# Patient Record
Sex: FEMALE | Race: WHITE | NOT HISPANIC OR LATINO | Employment: FULL TIME | URBAN - METROPOLITAN AREA
[De-identification: names, ages, dates, MRNs, and addresses within clinical notes are randomized per-mention and may not be internally consistent; named-entity substitution may affect disease eponyms.]

---

## 2022-12-10 ENCOUNTER — HOSPITAL ENCOUNTER (OUTPATIENT)
Facility: HOSPITAL | Age: 77
Setting detail: OBSERVATION
Discharge: HOME | End: 2022-12-12
Attending: EMERGENCY MEDICINE | Admitting: INTERNAL MEDICINE

## 2022-12-10 ENCOUNTER — APPOINTMENT (OUTPATIENT)
Dept: CT IMAGING | Facility: HOSPITAL | Age: 77
End: 2022-12-10

## 2022-12-10 DIAGNOSIS — R55 SYNCOPE, UNSPECIFIED SYNCOPE TYPE: Primary | ICD-10-CM

## 2022-12-10 LAB
ALBUMIN SERPL-MCNC: 4.1 G/DL (ref 3.5–5.2)
ALBUMIN/GLOB SERPL: 1.3 G/DL
ALP SERPL-CCNC: 75 U/L (ref 39–117)
ALT SERPL W P-5'-P-CCNC: 12 U/L (ref 1–33)
ANION GAP SERPL CALCULATED.3IONS-SCNC: 14.2 MMOL/L (ref 5–15)
AST SERPL-CCNC: 19 U/L (ref 1–32)
BACTERIA UR QL AUTO: ABNORMAL /HPF
BASOPHILS # BLD AUTO: 0.04 10*3/MM3 (ref 0–0.2)
BASOPHILS NFR BLD AUTO: 0.3 % (ref 0–1.5)
BILIRUB SERPL-MCNC: 0.2 MG/DL (ref 0–1.2)
BILIRUB UR QL STRIP: NEGATIVE
BUN SERPL-MCNC: 22 MG/DL (ref 8–23)
BUN/CREAT SERPL: 20.4 (ref 7–25)
CALCIUM SPEC-SCNC: 8.9 MG/DL (ref 8.6–10.5)
CHLORIDE SERPL-SCNC: 101 MMOL/L (ref 98–107)
CLARITY UR: ABNORMAL
CO2 SERPL-SCNC: 20.8 MMOL/L (ref 22–29)
COLOR UR: YELLOW
CREAT SERPL-MCNC: 1.08 MG/DL (ref 0.57–1)
DEPRECATED RDW RBC AUTO: 45.8 FL (ref 37–54)
EGFRCR SERPLBLD CKD-EPI 2021: 53.3 ML/MIN/1.73
EOSINOPHIL # BLD AUTO: 0.29 10*3/MM3 (ref 0–0.4)
EOSINOPHIL NFR BLD AUTO: 2.4 % (ref 0.3–6.2)
ERYTHROCYTE [DISTWIDTH] IN BLOOD BY AUTOMATED COUNT: 14.1 % (ref 12.3–15.4)
GLOBULIN UR ELPH-MCNC: 3.2 GM/DL
GLUCOSE BLDC GLUCOMTR-MCNC: 112 MG/DL (ref 70–99)
GLUCOSE SERPL-MCNC: 113 MG/DL (ref 65–99)
GLUCOSE UR STRIP-MCNC: NEGATIVE MG/DL
HCT VFR BLD AUTO: 33.3 % (ref 34–46.6)
HGB BLD-MCNC: 10.8 G/DL (ref 12–15.9)
HGB UR QL STRIP.AUTO: ABNORMAL
HOLD SPECIMEN: NORMAL
HOLD SPECIMEN: NORMAL
HYALINE CASTS UR QL AUTO: ABNORMAL /LPF
IMM GRANULOCYTES # BLD AUTO: 0.05 10*3/MM3 (ref 0–0.05)
IMM GRANULOCYTES NFR BLD AUTO: 0.4 % (ref 0–0.5)
KETONES UR QL STRIP: ABNORMAL
LEUKOCYTE ESTERASE UR QL STRIP.AUTO: ABNORMAL
LYMPHOCYTES # BLD AUTO: 2.61 10*3/MM3 (ref 0.7–3.1)
LYMPHOCYTES NFR BLD AUTO: 21.8 % (ref 19.6–45.3)
MAGNESIUM SERPL-MCNC: 2 MG/DL (ref 1.6–2.4)
MCH RBC QN AUTO: 29 PG (ref 26.6–33)
MCHC RBC AUTO-ENTMCNC: 32.4 G/DL (ref 31.5–35.7)
MCV RBC AUTO: 89.5 FL (ref 79–97)
MONOCYTES # BLD AUTO: 1.03 10*3/MM3 (ref 0.1–0.9)
MONOCYTES NFR BLD AUTO: 8.6 % (ref 5–12)
NEUTROPHILS NFR BLD AUTO: 66.5 % (ref 42.7–76)
NEUTROPHILS NFR BLD AUTO: 7.98 10*3/MM3 (ref 1.7–7)
NITRITE UR QL STRIP: NEGATIVE
NRBC BLD AUTO-RTO: 0 /100 WBC (ref 0–0.2)
PH UR STRIP.AUTO: 5.5 [PH] (ref 5–8)
PLATELET # BLD AUTO: 424 10*3/MM3 (ref 140–450)
PMV BLD AUTO: 8.5 FL (ref 6–12)
POTASSIUM SERPL-SCNC: 3 MMOL/L (ref 3.5–5.2)
PROT SERPL-MCNC: 7.3 G/DL (ref 6–8.5)
PROT UR QL STRIP: ABNORMAL
RBC # BLD AUTO: 3.72 10*6/MM3 (ref 3.77–5.28)
RBC # UR STRIP: ABNORMAL /HPF
REF LAB TEST METHOD: ABNORMAL
SODIUM SERPL-SCNC: 136 MMOL/L (ref 136–145)
SP GR UR STRIP: 1.02 (ref 1–1.03)
SQUAMOUS #/AREA URNS HPF: ABNORMAL /HPF
TROPONIN T SERPL-MCNC: <0.01 NG/ML (ref 0–0.03)
UROBILINOGEN UR QL STRIP: ABNORMAL
WBC # UR STRIP: ABNORMAL /HPF
WBC NRBC COR # BLD: 12 10*3/MM3 (ref 3.4–10.8)
WHOLE BLOOD HOLD COAG: NORMAL
WHOLE BLOOD HOLD SPECIMEN: NORMAL

## 2022-12-10 PROCEDURE — 83735 ASSAY OF MAGNESIUM: CPT | Performed by: EMERGENCY MEDICINE

## 2022-12-10 PROCEDURE — G0378 HOSPITAL OBSERVATION PER HR: HCPCS

## 2022-12-10 PROCEDURE — 93005 ELECTROCARDIOGRAM TRACING: CPT

## 2022-12-10 PROCEDURE — 81003 URINALYSIS AUTO W/O SCOPE: CPT | Performed by: EMERGENCY MEDICINE

## 2022-12-10 PROCEDURE — 96374 THER/PROPH/DIAG INJ IV PUSH: CPT

## 2022-12-10 PROCEDURE — 99285 EMERGENCY DEPT VISIT HI MDM: CPT

## 2022-12-10 PROCEDURE — 81015 MICROSCOPIC EXAM OF URINE: CPT | Performed by: EMERGENCY MEDICINE

## 2022-12-10 PROCEDURE — 70450 CT HEAD/BRAIN W/O DYE: CPT

## 2022-12-10 PROCEDURE — 82962 GLUCOSE BLOOD TEST: CPT

## 2022-12-10 PROCEDURE — 94799 UNLISTED PULMONARY SVC/PX: CPT

## 2022-12-10 PROCEDURE — 85025 COMPLETE CBC W/AUTO DIFF WBC: CPT | Performed by: EMERGENCY MEDICINE

## 2022-12-10 PROCEDURE — 99220 PR INITIAL OBSERVATION CARE/DAY 70 MINUTES: CPT | Performed by: INTERNAL MEDICINE

## 2022-12-10 PROCEDURE — 84484 ASSAY OF TROPONIN QUANT: CPT | Performed by: EMERGENCY MEDICINE

## 2022-12-10 PROCEDURE — 80053 COMPREHEN METABOLIC PANEL: CPT | Performed by: EMERGENCY MEDICINE

## 2022-12-10 PROCEDURE — 93010 ELECTROCARDIOGRAM REPORT: CPT | Performed by: INTERNAL MEDICINE

## 2022-12-10 PROCEDURE — 93005 ELECTROCARDIOGRAM TRACING: CPT | Performed by: EMERGENCY MEDICINE

## 2022-12-10 PROCEDURE — 96376 TX/PRO/DX INJ SAME DRUG ADON: CPT

## 2022-12-10 RX ORDER — SODIUM CHLORIDE 9 MG/ML
40 INJECTION, SOLUTION INTRAVENOUS AS NEEDED
Status: DISCONTINUED | OUTPATIENT
Start: 2022-12-10 | End: 2022-12-12 | Stop reason: HOSPADM

## 2022-12-10 RX ORDER — SODIUM CHLORIDE 0.9 % (FLUSH) 0.9 %
10 SYRINGE (ML) INJECTION AS NEEDED
Status: DISCONTINUED | OUTPATIENT
Start: 2022-12-10 | End: 2022-12-12 | Stop reason: HOSPADM

## 2022-12-10 RX ORDER — NITROGLYCERIN 0.4 MG/1
0.4 TABLET SUBLINGUAL
Status: DISCONTINUED | OUTPATIENT
Start: 2022-12-10 | End: 2022-12-12 | Stop reason: HOSPADM

## 2022-12-10 RX ORDER — LABETALOL HYDROCHLORIDE 5 MG/ML
10 INJECTION, SOLUTION INTRAVENOUS EVERY 6 HOURS PRN
Status: DISCONTINUED | OUTPATIENT
Start: 2022-12-10 | End: 2022-12-11

## 2022-12-10 RX ORDER — ACETAMINOPHEN 325 MG/1
650 TABLET ORAL EVERY 4 HOURS PRN
Status: DISCONTINUED | OUTPATIENT
Start: 2022-12-10 | End: 2022-12-12 | Stop reason: HOSPADM

## 2022-12-10 RX ORDER — CITALOPRAM 10 MG/1
10 TABLET ORAL DAILY
Status: ON HOLD | COMMUNITY
End: 2022-12-12 | Stop reason: SDUPTHER

## 2022-12-10 RX ORDER — HYDRALAZINE HYDROCHLORIDE 25 MG/1
25 TABLET, FILM COATED ORAL EVERY 8 HOURS PRN
Status: DISCONTINUED | OUTPATIENT
Start: 2022-12-10 | End: 2022-12-12 | Stop reason: HOSPADM

## 2022-12-10 RX ORDER — SODIUM CHLORIDE 0.9 % (FLUSH) 0.9 %
10 SYRINGE (ML) INJECTION EVERY 12 HOURS SCHEDULED
Status: DISCONTINUED | OUTPATIENT
Start: 2022-12-10 | End: 2022-12-12 | Stop reason: HOSPADM

## 2022-12-10 RX ORDER — ONDANSETRON 2 MG/ML
4 INJECTION INTRAMUSCULAR; INTRAVENOUS EVERY 6 HOURS PRN
Status: DISCONTINUED | OUTPATIENT
Start: 2022-12-10 | End: 2022-12-12 | Stop reason: HOSPADM

## 2022-12-10 RX ORDER — ENOXAPARIN SODIUM 100 MG/ML
40 INJECTION SUBCUTANEOUS NIGHTLY
Status: DISCONTINUED | OUTPATIENT
Start: 2022-12-10 | End: 2022-12-12 | Stop reason: HOSPADM

## 2022-12-10 RX ORDER — LABETALOL HYDROCHLORIDE 5 MG/ML
20 INJECTION, SOLUTION INTRAVENOUS ONCE
Status: COMPLETED | OUTPATIENT
Start: 2022-12-10 | End: 2022-12-10

## 2022-12-10 RX ORDER — AMLODIPINE BESYLATE 5 MG/1
5 TABLET ORAL
Status: DISCONTINUED | OUTPATIENT
Start: 2022-12-10 | End: 2022-12-11

## 2022-12-10 RX ORDER — ACETAMINOPHEN 650 MG/1
650 SUPPOSITORY RECTAL EVERY 4 HOURS PRN
Status: DISCONTINUED | OUTPATIENT
Start: 2022-12-10 | End: 2022-12-12 | Stop reason: HOSPADM

## 2022-12-10 RX ORDER — ACETAMINOPHEN 160 MG/5ML
650 SOLUTION ORAL EVERY 4 HOURS PRN
Status: DISCONTINUED | OUTPATIENT
Start: 2022-12-10 | End: 2022-12-12 | Stop reason: HOSPADM

## 2022-12-10 RX ADMIN — LABETALOL 20 MG/4 ML (5 MG/ML) INTRAVENOUS SYRINGE 20 MG: at 13:04

## 2022-12-10 RX ADMIN — LABETALOL 20 MG/4 ML (5 MG/ML) INTRAVENOUS SYRINGE 20 MG: at 15:56

## 2022-12-10 RX ADMIN — Medication 10 ML: at 20:36

## 2022-12-10 RX ADMIN — AMLODIPINE BESYLATE 5 MG: 5 TABLET ORAL at 20:02

## 2022-12-11 ENCOUNTER — APPOINTMENT (OUTPATIENT)
Dept: CARDIOLOGY | Facility: HOSPITAL | Age: 77
End: 2022-12-11

## 2022-12-11 LAB
ALBUMIN SERPL-MCNC: 3.5 G/DL (ref 3.5–5.2)
ALBUMIN/GLOB SERPL: 1.3 G/DL
ALP SERPL-CCNC: 63 U/L (ref 39–117)
ALT SERPL W P-5'-P-CCNC: 9 U/L (ref 1–33)
ANION GAP SERPL CALCULATED.3IONS-SCNC: 9.6 MMOL/L (ref 5–15)
AST SERPL-CCNC: 13 U/L (ref 1–32)
BASOPHILS # BLD AUTO: 0.02 10*3/MM3 (ref 0–0.2)
BASOPHILS NFR BLD AUTO: 0.2 % (ref 0–1.5)
BH CV ECHO MEAS - AO ROOT DIAM: 3.6 CM
BH CV ECHO MEAS - EDV(MOD-SP2): 43 ML
BH CV ECHO MEAS - EDV(MOD-SP4): 43 ML
BH CV ECHO MEAS - EF(MOD-BP): 53.4 %
BH CV ECHO MEAS - ESV(MOD-SP2): 20 ML
BH CV ECHO MEAS - ESV(MOD-SP4): 20 ML
BH CV ECHO MEAS - IVSD: 1.3 CM
BH CV ECHO MEAS - LA DIMENSION: 3 CM
BH CV ECHO MEAS - LAT PEAK E' VEL: 7 CM/SEC
BH CV ECHO MEAS - LVIDD: 3.6 CM
BH CV ECHO MEAS - LVIDS: 2.5 CM
BH CV ECHO MEAS - LVOT DIAM: 2 CM
BH CV ECHO MEAS - LVPWD: 1.1 CM
BH CV ECHO MEAS - MED PEAK E' VEL: 5 CM/SEC
BH CV ECHO MEAS - MV A MAX VEL: 114 CM/SEC
BH CV ECHO MEAS - MV DEC TIME: 184 MSEC
BH CV ECHO MEAS - MV E MAX VEL: 77 CM/SEC
BH CV ECHO MEAS - MV E/A: 0.7
BH CV ECHO MEAS - RVDD: 2.7 CM
BH CV ECHO MEAS - TAPSE (>1.6): 2.48 CM
BH CV ECHO MEASUREMENTS AVERAGE E/E' RATIO: 12.83
BILIRUB SERPL-MCNC: <0.2 MG/DL (ref 0–1.2)
BUN SERPL-MCNC: 20 MG/DL (ref 8–23)
BUN/CREAT SERPL: 23.3 (ref 7–25)
CALCIUM SPEC-SCNC: 8.6 MG/DL (ref 8.6–10.5)
CHLORIDE SERPL-SCNC: 107 MMOL/L (ref 98–107)
CO2 SERPL-SCNC: 24.4 MMOL/L (ref 22–29)
CREAT SERPL-MCNC: 0.86 MG/DL (ref 0.57–1)
DEPRECATED RDW RBC AUTO: 45.1 FL (ref 37–54)
EGFRCR SERPLBLD CKD-EPI 2021: 70.1 ML/MIN/1.73
EOSINOPHIL # BLD AUTO: 0.34 10*3/MM3 (ref 0–0.4)
EOSINOPHIL NFR BLD AUTO: 3.9 % (ref 0.3–6.2)
ERYTHROCYTE [DISTWIDTH] IN BLOOD BY AUTOMATED COUNT: 13.9 % (ref 12.3–15.4)
FERRITIN SERPL-MCNC: 77.91 NG/ML (ref 13–150)
GLOBULIN UR ELPH-MCNC: 2.7 GM/DL
GLUCOSE SERPL-MCNC: 118 MG/DL (ref 65–99)
HBA1C MFR BLD: 6.1 % (ref 4.8–5.6)
HCT VFR BLD AUTO: 29.2 % (ref 34–46.6)
HGB BLD-MCNC: 9.5 G/DL (ref 12–15.9)
IMM GRANULOCYTES # BLD AUTO: 0.03 10*3/MM3 (ref 0–0.05)
IMM GRANULOCYTES NFR BLD AUTO: 0.3 % (ref 0–0.5)
IRON 24H UR-MRATE: 24 MCG/DL (ref 37–145)
IRON SATN MFR SERPL: 7 % (ref 20–50)
IVRT: 74 MSEC
LEFT ATRIUM VOLUME INDEX: 19.2 ML/M2
LYMPHOCYTES # BLD AUTO: 2.97 10*3/MM3 (ref 0.7–3.1)
LYMPHOCYTES NFR BLD AUTO: 33.9 % (ref 19.6–45.3)
MAGNESIUM SERPL-MCNC: 2 MG/DL (ref 1.6–2.4)
MAXIMAL PREDICTED HEART RATE: 144 BPM
MCH RBC QN AUTO: 28.7 PG (ref 26.6–33)
MCHC RBC AUTO-ENTMCNC: 32.5 G/DL (ref 31.5–35.7)
MCV RBC AUTO: 88.2 FL (ref 79–97)
MONOCYTES # BLD AUTO: 0.98 10*3/MM3 (ref 0.1–0.9)
MONOCYTES NFR BLD AUTO: 11.2 % (ref 5–12)
NEUTROPHILS NFR BLD AUTO: 4.43 10*3/MM3 (ref 1.7–7)
NEUTROPHILS NFR BLD AUTO: 50.5 % (ref 42.7–76)
NRBC BLD AUTO-RTO: 0 /100 WBC (ref 0–0.2)
PLATELET # BLD AUTO: 391 10*3/MM3 (ref 140–450)
PMV BLD AUTO: 8.3 FL (ref 6–12)
POTASSIUM SERPL-SCNC: 3.6 MMOL/L (ref 3.5–5.2)
PROT SERPL-MCNC: 6.2 G/DL (ref 6–8.5)
QT INTERVAL: 426 MS
RBC # BLD AUTO: 3.31 10*6/MM3 (ref 3.77–5.28)
RETICS # AUTO: 0.04 10*6/MM3 (ref 0.02–0.13)
RETICS/RBC NFR AUTO: 1.32 % (ref 0.7–1.9)
SODIUM SERPL-SCNC: 141 MMOL/L (ref 136–145)
STRESS TARGET HR: 122 BPM
TIBC SERPL-MCNC: 322 MCG/DL (ref 298–536)
TRANSFERRIN SERPL-MCNC: 216 MG/DL (ref 200–360)
WBC NRBC COR # BLD: 8.77 10*3/MM3 (ref 3.4–10.8)

## 2022-12-11 PROCEDURE — 83540 ASSAY OF IRON: CPT | Performed by: INTERNAL MEDICINE

## 2022-12-11 PROCEDURE — 99226 PR SBSQ OBSERVATION CARE/DAY 35 MINUTES: CPT | Performed by: INTERNAL MEDICINE

## 2022-12-11 PROCEDURE — 82728 ASSAY OF FERRITIN: CPT | Performed by: INTERNAL MEDICINE

## 2022-12-11 PROCEDURE — 85045 AUTOMATED RETICULOCYTE COUNT: CPT | Performed by: INTERNAL MEDICINE

## 2022-12-11 PROCEDURE — G0378 HOSPITAL OBSERVATION PER HR: HCPCS

## 2022-12-11 PROCEDURE — 83036 HEMOGLOBIN GLYCOSYLATED A1C: CPT | Performed by: INTERNAL MEDICINE

## 2022-12-11 PROCEDURE — 85025 COMPLETE CBC W/AUTO DIFF WBC: CPT | Performed by: INTERNAL MEDICINE

## 2022-12-11 PROCEDURE — 80053 COMPREHEN METABOLIC PANEL: CPT | Performed by: INTERNAL MEDICINE

## 2022-12-11 PROCEDURE — 94799 UNLISTED PULMONARY SVC/PX: CPT

## 2022-12-11 PROCEDURE — 93306 TTE W/DOPPLER COMPLETE: CPT

## 2022-12-11 PROCEDURE — 83735 ASSAY OF MAGNESIUM: CPT | Performed by: INTERNAL MEDICINE

## 2022-12-11 PROCEDURE — 84466 ASSAY OF TRANSFERRIN: CPT | Performed by: INTERNAL MEDICINE

## 2022-12-11 RX ORDER — LABETALOL HYDROCHLORIDE 5 MG/ML
10 INJECTION, SOLUTION INTRAVENOUS EVERY 4 HOURS PRN
Status: DISCONTINUED | OUTPATIENT
Start: 2022-12-11 | End: 2022-12-12 | Stop reason: HOSPADM

## 2022-12-11 RX ORDER — POTASSIUM CHLORIDE 750 MG/1
40 CAPSULE, EXTENDED RELEASE ORAL ONCE
Status: COMPLETED | OUTPATIENT
Start: 2022-12-11 | End: 2022-12-11

## 2022-12-11 RX ORDER — SODIUM CHLORIDE, SODIUM LACTATE, POTASSIUM CHLORIDE, CALCIUM CHLORIDE 600; 310; 30; 20 MG/100ML; MG/100ML; MG/100ML; MG/100ML
100 INJECTION, SOLUTION INTRAVENOUS CONTINUOUS
Status: ACTIVE | OUTPATIENT
Start: 2022-12-11 | End: 2022-12-11

## 2022-12-11 RX ORDER — CARVEDILOL 6.25 MG/1
6.25 TABLET ORAL 2 TIMES DAILY WITH MEALS
Status: DISCONTINUED | OUTPATIENT
Start: 2022-12-11 | End: 2022-12-12 | Stop reason: HOSPADM

## 2022-12-11 RX ORDER — FERROUS SULFATE 325(65) MG
325 TABLET ORAL
Status: DISCONTINUED | OUTPATIENT
Start: 2022-12-12 | End: 2022-12-12 | Stop reason: HOSPADM

## 2022-12-11 RX ORDER — LORAZEPAM 2 MG/ML
0.5 INJECTION INTRAMUSCULAR ONCE
Status: DISCONTINUED | OUTPATIENT
Start: 2022-12-11 | End: 2022-12-11

## 2022-12-11 RX ORDER — CARVEDILOL 3.12 MG/1
3.12 TABLET ORAL 2 TIMES DAILY WITH MEALS
Status: DISCONTINUED | OUTPATIENT
Start: 2022-12-11 | End: 2022-12-11

## 2022-12-11 RX ADMIN — CARVEDILOL 6.25 MG: 6.25 TABLET, FILM COATED ORAL at 17:30

## 2022-12-11 RX ADMIN — SODIUM CHLORIDE, POTASSIUM CHLORIDE, SODIUM LACTATE AND CALCIUM CHLORIDE 100 ML/HR: 600; 310; 30; 20 INJECTION, SOLUTION INTRAVENOUS at 11:16

## 2022-12-11 RX ADMIN — CARVEDILOL 3.12 MG: 3.12 TABLET, FILM COATED ORAL at 11:07

## 2022-12-11 RX ADMIN — Medication 10 ML: at 09:36

## 2022-12-11 RX ADMIN — POTASSIUM CHLORIDE 40 MEQ: 10 CAPSULE, COATED, EXTENDED RELEASE ORAL at 11:07

## 2022-12-11 RX ADMIN — AMLODIPINE BESYLATE 5 MG: 5 TABLET ORAL at 09:36

## 2022-12-11 RX ADMIN — Medication 10 ML: at 20:00

## 2022-12-12 ENCOUNTER — READMISSION MANAGEMENT (OUTPATIENT)
Dept: CALL CENTER | Facility: HOSPITAL | Age: 77
End: 2022-12-12

## 2022-12-12 VITALS
HEART RATE: 79 BPM | HEIGHT: 65 IN | OXYGEN SATURATION: 100 % | WEIGHT: 141.98 LBS | BODY MASS INDEX: 23.65 KG/M2 | RESPIRATION RATE: 18 BRPM | DIASTOLIC BLOOD PRESSURE: 85 MMHG | TEMPERATURE: 97.7 F | SYSTOLIC BLOOD PRESSURE: 179 MMHG

## 2022-12-12 LAB
ALBUMIN SERPL-MCNC: 3.6 G/DL (ref 3.5–5.2)
ANION GAP SERPL CALCULATED.3IONS-SCNC: 7.2 MMOL/L (ref 5–15)
BUN SERPL-MCNC: 15 MG/DL (ref 8–23)
BUN/CREAT SERPL: 20.5 (ref 7–25)
CALCIUM SPEC-SCNC: 8.9 MG/DL (ref 8.6–10.5)
CHLORIDE SERPL-SCNC: 104 MMOL/L (ref 98–107)
CO2 SERPL-SCNC: 25.8 MMOL/L (ref 22–29)
CREAT SERPL-MCNC: 0.73 MG/DL (ref 0.57–1)
DEPRECATED RDW RBC AUTO: 45.3 FL (ref 37–54)
EGFRCR SERPLBLD CKD-EPI 2021: 85.4 ML/MIN/1.73
ERYTHROCYTE [DISTWIDTH] IN BLOOD BY AUTOMATED COUNT: 14 % (ref 12.3–15.4)
FOLATE SERPL-MCNC: 7.84 NG/ML (ref 4.78–24.2)
GLUCOSE SERPL-MCNC: 114 MG/DL (ref 65–99)
HCT VFR BLD AUTO: 29.5 % (ref 34–46.6)
HGB BLD-MCNC: 9.6 G/DL (ref 12–15.9)
MAGNESIUM SERPL-MCNC: 2 MG/DL (ref 1.6–2.4)
MCH RBC QN AUTO: 28.7 PG (ref 26.6–33)
MCHC RBC AUTO-ENTMCNC: 32.5 G/DL (ref 31.5–35.7)
MCV RBC AUTO: 88.3 FL (ref 79–97)
PHOSPHATE SERPL-MCNC: 3.3 MG/DL (ref 2.5–4.5)
PLATELET # BLD AUTO: 386 10*3/MM3 (ref 140–450)
PMV BLD AUTO: 8.6 FL (ref 6–12)
POTASSIUM SERPL-SCNC: 4.1 MMOL/L (ref 3.5–5.2)
QT INTERVAL: 443 MS
RBC # BLD AUTO: 3.34 10*6/MM3 (ref 3.77–5.28)
SODIUM SERPL-SCNC: 137 MMOL/L (ref 136–145)
TROPONIN T SERPL-MCNC: <0.01 NG/ML (ref 0–0.03)
TSH SERPL DL<=0.05 MIU/L-ACNC: 1.33 UIU/ML (ref 0.27–4.2)
VIT B12 BLD-MCNC: 183 PG/ML (ref 211–946)
WBC NRBC COR # BLD: 8.77 10*3/MM3 (ref 3.4–10.8)

## 2022-12-12 PROCEDURE — 93010 ELECTROCARDIOGRAM REPORT: CPT | Performed by: INTERNAL MEDICINE

## 2022-12-12 PROCEDURE — 94799 UNLISTED PULMONARY SVC/PX: CPT

## 2022-12-12 PROCEDURE — 82607 VITAMIN B-12: CPT | Performed by: INTERNAL MEDICINE

## 2022-12-12 PROCEDURE — 93005 ELECTROCARDIOGRAM TRACING: CPT | Performed by: INTERNAL MEDICINE

## 2022-12-12 PROCEDURE — 99217 PR OBSERVATION CARE DISCHARGE MANAGEMENT: CPT | Performed by: INTERNAL MEDICINE

## 2022-12-12 PROCEDURE — 84443 ASSAY THYROID STIM HORMONE: CPT | Performed by: INTERNAL MEDICINE

## 2022-12-12 PROCEDURE — 82746 ASSAY OF FOLIC ACID SERUM: CPT | Performed by: INTERNAL MEDICINE

## 2022-12-12 PROCEDURE — 85027 COMPLETE CBC AUTOMATED: CPT | Performed by: INTERNAL MEDICINE

## 2022-12-12 PROCEDURE — 80069 RENAL FUNCTION PANEL: CPT | Performed by: INTERNAL MEDICINE

## 2022-12-12 PROCEDURE — 83735 ASSAY OF MAGNESIUM: CPT | Performed by: INTERNAL MEDICINE

## 2022-12-12 PROCEDURE — G0378 HOSPITAL OBSERVATION PER HR: HCPCS

## 2022-12-12 PROCEDURE — 84484 ASSAY OF TROPONIN QUANT: CPT | Performed by: INTERNAL MEDICINE

## 2022-12-12 RX ORDER — AMOXICILLIN 250 MG
1 CAPSULE ORAL DAILY PRN
Qty: 30 TABLET | Refills: 0 | Status: SHIPPED | OUTPATIENT
Start: 2022-12-12 | End: 2023-01-11

## 2022-12-12 RX ORDER — LANOLIN ALCOHOL/MO/W.PET/CERES
1000 CREAM (GRAM) TOPICAL DAILY
Qty: 90 TABLET | Refills: 0 | Status: SHIPPED | OUTPATIENT
Start: 2022-12-12 | End: 2023-03-12

## 2022-12-12 RX ORDER — CITALOPRAM 10 MG/1
5 TABLET ORAL DAILY
Start: 2022-12-12

## 2022-12-12 RX ORDER — CARVEDILOL 6.25 MG/1
6.25 TABLET ORAL 2 TIMES DAILY WITH MEALS
Qty: 60 TABLET | Refills: 0 | Status: SHIPPED | OUTPATIENT
Start: 2022-12-12 | End: 2023-01-11

## 2022-12-12 RX ORDER — FERROUS SULFATE 325(65) MG
325 TABLET ORAL
Qty: 90 TABLET | Refills: 0 | Status: SHIPPED | OUTPATIENT
Start: 2022-12-12

## 2022-12-12 RX ADMIN — FERROUS SULFATE TAB 325 MG (65 MG ELEMENTAL FE) 325 MG: 325 (65 FE) TAB at 08:41

## 2022-12-12 RX ADMIN — CARVEDILOL 6.25 MG: 6.25 TABLET, FILM COATED ORAL at 08:41

## 2022-12-12 RX ADMIN — Medication 10 ML: at 08:41

## 2024-09-30 ENCOUNTER — APPOINTMENT (OUTPATIENT)
Dept: GENERAL RADIOLOGY | Facility: HOSPITAL | Age: 79
End: 2024-09-30
Payer: COMMERCIAL

## 2024-09-30 ENCOUNTER — APPOINTMENT (OUTPATIENT)
Dept: CT IMAGING | Facility: HOSPITAL | Age: 79
End: 2024-09-30
Payer: COMMERCIAL

## 2024-09-30 ENCOUNTER — HOSPITAL ENCOUNTER (EMERGENCY)
Facility: HOSPITAL | Age: 79
Discharge: HOME | End: 2024-09-30
Attending: EMERGENCY MEDICINE
Payer: COMMERCIAL

## 2024-09-30 ENCOUNTER — TRANSCRIBE ORDERS (OUTPATIENT)
Dept: ADMINISTRATIVE | Facility: HOSPITAL | Age: 79
End: 2024-09-30
Payer: COMMERCIAL

## 2024-09-30 VITALS
WEIGHT: 132.72 LBS | OXYGEN SATURATION: 98 % | RESPIRATION RATE: 18 BRPM | HEIGHT: 65 IN | HEART RATE: 71 BPM | DIASTOLIC BLOOD PRESSURE: 72 MMHG | BODY MASS INDEX: 22.11 KG/M2 | TEMPERATURE: 97.6 F | SYSTOLIC BLOOD PRESSURE: 119 MMHG

## 2024-09-30 DIAGNOSIS — N39.0 ACUTE URINARY TRACT INFECTION: Primary | ICD-10-CM

## 2024-09-30 DIAGNOSIS — R59.0 LOCALIZED ENLARGED LYMPH NODES: ICD-10-CM

## 2024-09-30 DIAGNOSIS — M19.031 ARTHRITIS OF RIGHT WRIST: ICD-10-CM

## 2024-09-30 DIAGNOSIS — K76.9 LIVER LESION: ICD-10-CM

## 2024-09-30 DIAGNOSIS — K68.3 RETROPERITONEAL HEMATOMA: Primary | ICD-10-CM

## 2024-09-30 DIAGNOSIS — R91.8 PULMONARY NODULES: ICD-10-CM

## 2024-09-30 LAB
ALBUMIN SERPL-MCNC: 3.7 G/DL (ref 3.5–5.2)
ALBUMIN/GLOB SERPL: 0.9 G/DL
ALP SERPL-CCNC: 80 U/L (ref 39–117)
ALT SERPL W P-5'-P-CCNC: 6 U/L (ref 1–33)
AMORPH URATE CRY URNS QL MICRO: ABNORMAL /HPF
ANION GAP SERPL CALCULATED.3IONS-SCNC: 11.8 MMOL/L (ref 5–15)
AST SERPL-CCNC: 14 U/L (ref 1–32)
BACTERIA UR QL AUTO: ABNORMAL /HPF
BASOPHILS # BLD AUTO: 0.06 10*3/MM3 (ref 0–0.2)
BASOPHILS NFR BLD AUTO: 0.5 % (ref 0–1.5)
BILIRUB SERPL-MCNC: 0.3 MG/DL (ref 0–1.2)
BILIRUB UR QL STRIP: NEGATIVE
BUN SERPL-MCNC: 23 MG/DL (ref 8–23)
BUN/CREAT SERPL: 19.5 (ref 7–25)
CALCIUM SPEC-SCNC: 9.4 MG/DL (ref 8.6–10.5)
CHLORIDE SERPL-SCNC: 102 MMOL/L (ref 98–107)
CLARITY UR: ABNORMAL
CO2 SERPL-SCNC: 22.2 MMOL/L (ref 22–29)
COLOR UR: YELLOW
CREAT SERPL-MCNC: 1.18 MG/DL (ref 0.57–1)
DEPRECATED RDW RBC AUTO: 44.6 FL (ref 37–54)
EGFRCR SERPLBLD CKD-EPI 2021: 47.4 ML/MIN/1.73
EOSINOPHIL # BLD AUTO: 0.24 10*3/MM3 (ref 0–0.4)
EOSINOPHIL NFR BLD AUTO: 2 % (ref 0.3–6.2)
ERYTHROCYTE [DISTWIDTH] IN BLOOD BY AUTOMATED COUNT: 13.4 % (ref 12.3–15.4)
GLOBULIN UR ELPH-MCNC: 3.9 GM/DL
GLUCOSE SERPL-MCNC: 145 MG/DL (ref 65–99)
GLUCOSE UR STRIP-MCNC: NEGATIVE MG/DL
HCT VFR BLD AUTO: 30.2 % (ref 34–46.6)
HGB BLD-MCNC: 9.4 G/DL (ref 12–15.9)
HGB UR QL STRIP.AUTO: ABNORMAL
HOLD SPECIMEN: NORMAL
HOLD SPECIMEN: NORMAL
HYALINE CASTS UR QL AUTO: ABNORMAL /LPF
IMM GRANULOCYTES # BLD AUTO: 0.07 10*3/MM3 (ref 0–0.05)
IMM GRANULOCYTES NFR BLD AUTO: 0.6 % (ref 0–0.5)
KETONES UR QL STRIP: NEGATIVE
LEUKOCYTE ESTERASE UR QL STRIP.AUTO: NEGATIVE
LYMPHOCYTES # BLD AUTO: 1.65 10*3/MM3 (ref 0.7–3.1)
LYMPHOCYTES NFR BLD AUTO: 13.4 % (ref 19.6–45.3)
MAGNESIUM SERPL-MCNC: 2.1 MG/DL (ref 1.6–2.4)
MCH RBC QN AUTO: 28.1 PG (ref 26.6–33)
MCHC RBC AUTO-ENTMCNC: 31.1 G/DL (ref 31.5–35.7)
MCV RBC AUTO: 90.4 FL (ref 79–97)
MONOCYTES # BLD AUTO: 1.1 10*3/MM3 (ref 0.1–0.9)
MONOCYTES NFR BLD AUTO: 8.9 % (ref 5–12)
NEUTROPHILS NFR BLD AUTO: 74.6 % (ref 42.7–76)
NEUTROPHILS NFR BLD AUTO: 9.18 10*3/MM3 (ref 1.7–7)
NITRITE UR QL STRIP: NEGATIVE
NRBC BLD AUTO-RTO: 0 /100 WBC (ref 0–0.2)
PH UR STRIP.AUTO: 7 [PH] (ref 5–8)
PLATELET # BLD AUTO: 399 10*3/MM3 (ref 140–450)
PMV BLD AUTO: 8.9 FL (ref 6–12)
POTASSIUM SERPL-SCNC: 4.5 MMOL/L (ref 3.5–5.2)
PROT SERPL-MCNC: 7.6 G/DL (ref 6–8.5)
PROT UR QL STRIP: NEGATIVE
QT INTERVAL: 450 MS
QTC INTERVAL: 491 MS
RBC # BLD AUTO: 3.34 10*6/MM3 (ref 3.77–5.28)
RBC # UR STRIP: ABNORMAL /HPF
REF LAB TEST METHOD: ABNORMAL
SODIUM SERPL-SCNC: 136 MMOL/L (ref 136–145)
SP GR UR STRIP: <=1.005 (ref 1–1.03)
SQUAMOUS #/AREA URNS HPF: ABNORMAL /HPF
TROPONIN T SERPL HS-MCNC: 31 NG/L
UROBILINOGEN UR QL STRIP: ABNORMAL
WBC # UR STRIP: ABNORMAL /HPF
WBC NRBC COR # BLD AUTO: 12.3 10*3/MM3 (ref 3.4–10.8)
WHOLE BLOOD HOLD COAG: NORMAL
WHOLE BLOOD HOLD SPECIMEN: NORMAL

## 2024-09-30 PROCEDURE — 73130 X-RAY EXAM OF HAND: CPT

## 2024-09-30 PROCEDURE — 25510000001 IOPAMIDOL PER 1 ML: Performed by: EMERGENCY MEDICINE

## 2024-09-30 PROCEDURE — 83735 ASSAY OF MAGNESIUM: CPT

## 2024-09-30 PROCEDURE — 85025 COMPLETE CBC W/AUTO DIFF WBC: CPT | Performed by: EMERGENCY MEDICINE

## 2024-09-30 PROCEDURE — 81001 URINALYSIS AUTO W/SCOPE: CPT | Performed by: EMERGENCY MEDICINE

## 2024-09-30 PROCEDURE — 96374 THER/PROPH/DIAG INJ IV PUSH: CPT

## 2024-09-30 PROCEDURE — 71045 X-RAY EXAM CHEST 1 VIEW: CPT

## 2024-09-30 PROCEDURE — 93005 ELECTROCARDIOGRAM TRACING: CPT

## 2024-09-30 PROCEDURE — 25010000002 KETOROLAC TROMETHAMINE PER 15 MG: Performed by: EMERGENCY MEDICINE

## 2024-09-30 PROCEDURE — 71260 CT THORAX DX C+: CPT

## 2024-09-30 PROCEDURE — 93005 ELECTROCARDIOGRAM TRACING: CPT | Performed by: EMERGENCY MEDICINE

## 2024-09-30 PROCEDURE — 84484 ASSAY OF TROPONIN QUANT: CPT

## 2024-09-30 PROCEDURE — 99285 EMERGENCY DEPT VISIT HI MDM: CPT

## 2024-09-30 PROCEDURE — 80053 COMPREHEN METABOLIC PANEL: CPT

## 2024-09-30 PROCEDURE — 25810000003 SODIUM CHLORIDE 0.9 % SOLUTION: Performed by: EMERGENCY MEDICINE

## 2024-09-30 RX ORDER — SODIUM CHLORIDE 0.9 % (FLUSH) 0.9 %
10 SYRINGE (ML) INJECTION AS NEEDED
Status: DISCONTINUED | OUTPATIENT
Start: 2024-09-30 | End: 2024-09-30 | Stop reason: HOSPADM

## 2024-09-30 RX ORDER — KETOROLAC TROMETHAMINE 15 MG/ML
15 INJECTION, SOLUTION INTRAMUSCULAR; INTRAVENOUS ONCE
Status: COMPLETED | OUTPATIENT
Start: 2024-09-30 | End: 2024-09-30

## 2024-09-30 RX ORDER — CEPHALEXIN 500 MG/1
500 CAPSULE ORAL 2 TIMES DAILY
Qty: 14 CAPSULE | Refills: 0 | Status: SHIPPED | OUTPATIENT
Start: 2024-09-30 | End: 2024-10-07

## 2024-09-30 RX ORDER — IOPAMIDOL 755 MG/ML
100 INJECTION, SOLUTION INTRAVASCULAR
Status: COMPLETED | OUTPATIENT
Start: 2024-09-30 | End: 2024-09-30

## 2024-09-30 RX ADMIN — KETOROLAC TROMETHAMINE 15 MG: 15 INJECTION, SOLUTION INTRAMUSCULAR; INTRAVENOUS at 10:44

## 2024-09-30 RX ADMIN — SODIUM CHLORIDE 1000 ML: 9 INJECTION, SOLUTION INTRAVENOUS at 10:44

## 2024-09-30 RX ADMIN — IOPAMIDOL 100 ML: 755 INJECTION, SOLUTION INTRAVENOUS at 10:28

## 2024-10-07 ENCOUNTER — HOSPITAL ENCOUNTER (OUTPATIENT)
Dept: NUCLEAR MEDICINE | Facility: HOSPITAL | Age: 79
Discharge: HOME | End: 2024-10-07
Payer: COMMERCIAL

## 2024-10-07 ENCOUNTER — OFFICE VISIT (OUTPATIENT)
Dept: PULMONOLOGY | Facility: CLINIC | Age: 79
End: 2024-10-07
Payer: COMMERCIAL

## 2024-10-07 ENCOUNTER — TELEPHONE (OUTPATIENT)
Dept: PULMONOLOGY | Facility: CLINIC | Age: 79
End: 2024-10-07
Payer: COMMERCIAL

## 2024-10-07 VITALS
SYSTOLIC BLOOD PRESSURE: 140 MMHG | WEIGHT: 132 LBS | RESPIRATION RATE: 16 BRPM | HEIGHT: 65 IN | OXYGEN SATURATION: 98 % | HEART RATE: 68 BPM | BODY MASS INDEX: 21.99 KG/M2 | DIASTOLIC BLOOD PRESSURE: 60 MMHG | TEMPERATURE: 98 F

## 2024-10-07 DIAGNOSIS — R59.0 LOCALIZED ENLARGED LYMPH NODES: ICD-10-CM

## 2024-10-07 DIAGNOSIS — R91.8 MULTIPLE LUNG NODULES ON CT: ICD-10-CM

## 2024-10-07 DIAGNOSIS — R93.89 ABNORMAL CT OF THE CHEST: Primary | ICD-10-CM

## 2024-10-07 PROCEDURE — A9503 TC99M MEDRONATE: HCPCS | Performed by: FAMILY MEDICINE

## 2024-10-07 PROCEDURE — 0 TECHNETIUM MEDRONATE KIT: Performed by: FAMILY MEDICINE

## 2024-10-07 PROCEDURE — 99204 OFFICE O/P NEW MOD 45 MIN: CPT | Performed by: NURSE PRACTITIONER

## 2024-10-07 PROCEDURE — 78306 BONE IMAGING WHOLE BODY: CPT

## 2024-10-07 RX ORDER — TC 99M MEDRONATE 20 MG/10ML
21.3 INJECTION, POWDER, LYOPHILIZED, FOR SOLUTION INTRAVENOUS
Status: COMPLETED | OUTPATIENT
Start: 2024-10-07 | End: 2024-10-07

## 2024-10-07 RX ORDER — FERROUS GLUCONATE 324(38)MG
1 TABLET ORAL EVERY 12 HOURS SCHEDULED
COMMUNITY
Start: 2024-09-24

## 2024-10-07 RX ORDER — DIAZEPAM 5 MG
0.5 TABLET ORAL EVERY 12 HOURS SCHEDULED
COMMUNITY
Start: 2024-08-26

## 2024-10-07 RX ADMIN — TC 99M MEDRONATE 21.3 MILLICURIE: 20 INJECTION, POWDER, LYOPHILIZED, FOR SOLUTION INTRAVENOUS at 11:30

## 2024-10-14 ENCOUNTER — ANESTHESIA EVENT (OUTPATIENT)
Dept: GASTROENTEROLOGY | Facility: HOSPITAL | Age: 79
End: 2024-10-14
Payer: COMMERCIAL

## 2024-10-15 ENCOUNTER — ANESTHESIA (OUTPATIENT)
Dept: GASTROENTEROLOGY | Facility: HOSPITAL | Age: 79
End: 2024-10-15
Payer: COMMERCIAL

## 2024-10-15 ENCOUNTER — PATIENT ROUNDING (BHMG ONLY) (OUTPATIENT)
Dept: PULMONOLOGY | Facility: CLINIC | Age: 79
End: 2024-10-15

## 2024-10-15 ENCOUNTER — HOSPITAL ENCOUNTER (OUTPATIENT)
Facility: HOSPITAL | Age: 79
Setting detail: HOSPITAL OUTPATIENT SURGERY
Discharge: HOME | End: 2024-10-15
Attending: INTERNAL MEDICINE | Admitting: INTERNAL MEDICINE
Payer: COMMERCIAL

## 2024-10-15 VITALS
BODY MASS INDEX: 22.01 KG/M2 | WEIGHT: 132.28 LBS | SYSTOLIC BLOOD PRESSURE: 135 MMHG | HEART RATE: 69 BPM | DIASTOLIC BLOOD PRESSURE: 74 MMHG | OXYGEN SATURATION: 93 % | RESPIRATION RATE: 18 BRPM | TEMPERATURE: 97.5 F

## 2024-10-15 DIAGNOSIS — R91.8 MULTIPLE LUNG NODULES ON CT: ICD-10-CM

## 2024-10-15 DIAGNOSIS — R93.89 ABNORMAL CT OF THE CHEST: ICD-10-CM

## 2024-10-15 LAB
ACB CMPLX DNA BAL NAA+NON-PRB-NCNCRNG: NOT DETECTED
BLACTX-M ISLT/SPM QL: NORMAL
BLAIMP ISLT/SPM QL: NORMAL
BLAKPC ISLT/SPM QL: NORMAL
BLAOXA-48-LIKE ISLT/SPM QL: NORMAL
BLAVIM ISLT/SPM QL: NORMAL
C PNEUM DNA NPH QL NAA+NON-PROBE: NOT DETECTED
E CLOAC COMP DNA BAL NAA+NON-PRB-NCNCRNG: NOT DETECTED
E COLI DNA BAL NAA+NON-PRB-NCNCRNG: NOT DETECTED
EOSINOPHIL NFR FLD MANUAL: 1 %
FLUAV SUBTYP SPEC NAA+PROBE: NOT DETECTED
FLUBV RNA ISLT QL NAA+PROBE: NOT DETECTED
GP B STREP DNA BAL NAA+NON-PRB-NCNCRNG: NOT DETECTED
HADV DNA SPEC NAA+PROBE: NOT DETECTED
HAEM INFLU DNA BAL NAA+NON-PRB-NCNCRNG: NOT DETECTED
HCOV RNA LOWER RESP QL NAA+NON-PROBE: NOT DETECTED
HMPV RNA NPH QL NAA+NON-PROBE: NOT DETECTED
HPIV RNA LOWER RESP QL NAA+NON-PROBE: NOT DETECTED
K AEROGENES DNA BAL NAA+NON-PRB-NCNCRNG: NOT DETECTED
K OXYTOCA DNA BAL NAA+NON-PRB-NCNCRNG: NOT DETECTED
K PNEU GRP DNA BAL NAA+NON-PRB-NCNCRNG: NOT DETECTED
L PNEUMO DNA LOWER RESP QL NAA+NON-PROBE: NOT DETECTED
LYMPHOCYTES NFR FLD MANUAL: 5 %
M CATARRHALIS DNA BAL NAA+NON-PRB-NCNCRNG: NOT DETECTED
M PNEUMO IGG SER IA-ACNC: NOT DETECTED
MACROPHAGE FLUID: 64 %
MECA+MECC ISLT/SPM QL: NORMAL
NDM GENE: NORMAL
NEUTROPHILS NFR FLD MANUAL: 30 %
P AERUGINOSA DNA BAL NAA+NON-PRB-NCNCRNG: NOT DETECTED
PROTEUS SP DNA BAL NAA+NON-PRB-NCNCRNG: NOT DETECTED
RHINOVIRUS RNA SPEC NAA+PROBE: NOT DETECTED
RSV RNA NPH QL NAA+NON-PROBE: NOT DETECTED
S AUREUS DNA BAL NAA+NON-PRB-NCNCRNG: NOT DETECTED
S MARCESCENS DNA BAL NAA+NON-PRB-NCNCRNG: NOT DETECTED
S PNEUM DNA BAL NAA+NON-PRB-NCNCRNG: NOT DETECTED
S PYO DNA BAL NAA+NON-PRB-NCNCRNG: NOT DETECTED
VISUAL PRESENCE OF BLOOD: NORMAL

## 2024-10-15 PROCEDURE — 88305 TISSUE EXAM BY PATHOLOGIST: CPT | Performed by: INTERNAL MEDICINE

## 2024-10-15 PROCEDURE — 87633 RESP VIRUS 12-25 TARGETS: CPT | Performed by: INTERNAL MEDICINE

## 2024-10-15 PROCEDURE — 25010000002 LIDOCAINE PF 2% 2 % SOLUTION

## 2024-10-15 PROCEDURE — 89051 BODY FLUID CELL COUNT: CPT | Performed by: INTERNAL MEDICINE

## 2024-10-15 PROCEDURE — 25010000002 LIDOCAINE HCL (PF) 4 % SOLUTION: Performed by: INTERNAL MEDICINE

## 2024-10-15 PROCEDURE — 87205 SMEAR GRAM STAIN: CPT | Performed by: INTERNAL MEDICINE

## 2024-10-15 PROCEDURE — 88342 IMHCHEM/IMCYTCHM 1ST ANTB: CPT | Performed by: INTERNAL MEDICINE

## 2024-10-15 PROCEDURE — 87102 FUNGUS ISOLATION CULTURE: CPT | Performed by: INTERNAL MEDICINE

## 2024-10-15 PROCEDURE — 88108 CYTOPATH CONCENTRATE TECH: CPT | Performed by: INTERNAL MEDICINE

## 2024-10-15 PROCEDURE — 25810000003 LACTATED RINGERS PER 1000 ML: Performed by: NURSE ANESTHETIST, CERTIFIED REGISTERED

## 2024-10-15 PROCEDURE — 87206 SMEAR FLUORESCENT/ACID STAI: CPT | Performed by: INTERNAL MEDICINE

## 2024-10-15 PROCEDURE — 88173 CYTOPATH EVAL FNA REPORT: CPT | Performed by: INTERNAL MEDICINE

## 2024-10-15 PROCEDURE — 87070 CULTURE OTHR SPECIMN AEROBIC: CPT | Performed by: INTERNAL MEDICINE

## 2024-10-15 PROCEDURE — 88341 IMHCHEM/IMCYTCHM EA ADD ANTB: CPT | Performed by: INTERNAL MEDICINE

## 2024-10-15 PROCEDURE — C1726 CATH, BAL DIL, NON-VASCULAR: HCPCS | Performed by: INTERNAL MEDICINE

## 2024-10-15 PROCEDURE — 87116 MYCOBACTERIA CULTURE: CPT | Performed by: INTERNAL MEDICINE

## 2024-10-15 PROCEDURE — 87071 CULTURE AEROBIC QUANT OTHER: CPT | Performed by: INTERNAL MEDICINE

## 2024-10-15 RX ORDER — PROMETHAZINE HYDROCHLORIDE 25 MG/1
25 SUPPOSITORY RECTAL ONCE AS NEEDED
Status: DISCONTINUED | OUTPATIENT
Start: 2024-10-15 | End: 2024-10-15 | Stop reason: HOSPADM

## 2024-10-15 RX ORDER — OXYCODONE HYDROCHLORIDE 5 MG/1
5 TABLET ORAL
Status: DISCONTINUED | OUTPATIENT
Start: 2024-10-15 | End: 2024-10-15 | Stop reason: HOSPADM

## 2024-10-15 RX ORDER — SODIUM CHLORIDE, SODIUM LACTATE, POTASSIUM CHLORIDE, CALCIUM CHLORIDE 600; 310; 30; 20 MG/100ML; MG/100ML; MG/100ML; MG/100ML
30 INJECTION, SOLUTION INTRAVENOUS CONTINUOUS
Status: DISCONTINUED | OUTPATIENT
Start: 2024-10-15 | End: 2024-10-15 | Stop reason: HOSPADM

## 2024-10-15 RX ORDER — PROMETHAZINE HYDROCHLORIDE 25 MG/1
25 TABLET ORAL ONCE AS NEEDED
Status: DISCONTINUED | OUTPATIENT
Start: 2024-10-15 | End: 2024-10-15 | Stop reason: HOSPADM

## 2024-10-15 RX ORDER — PROPOFOL 10 MG/ML
INJECTION, EMULSION INTRAVENOUS AS NEEDED
Status: DISCONTINUED | OUTPATIENT
Start: 2024-10-15 | End: 2024-10-15 | Stop reason: SURG

## 2024-10-15 RX ORDER — MAGNESIUM HYDROXIDE 1200 MG/15ML
LIQUID ORAL AS NEEDED
Status: DISCONTINUED | OUTPATIENT
Start: 2024-10-15 | End: 2024-10-15 | Stop reason: HOSPADM

## 2024-10-15 RX ORDER — EPHEDRINE SULFATE 50 MG/ML
INJECTION INTRAVENOUS AS NEEDED
Status: DISCONTINUED | OUTPATIENT
Start: 2024-10-15 | End: 2024-10-15 | Stop reason: SURG

## 2024-10-15 RX ORDER — LIDOCAINE HYDROCHLORIDE 40 MG/ML
INJECTION, SOLUTION RETROBULBAR AS NEEDED
Status: DISCONTINUED | OUTPATIENT
Start: 2024-10-15 | End: 2024-10-15 | Stop reason: HOSPADM

## 2024-10-15 RX ORDER — ONDANSETRON 2 MG/ML
4 INJECTION INTRAMUSCULAR; INTRAVENOUS ONCE AS NEEDED
Status: DISCONTINUED | OUTPATIENT
Start: 2024-10-15 | End: 2024-10-15 | Stop reason: HOSPADM

## 2024-10-15 RX ORDER — LIDOCAINE HYDROCHLORIDE 20 MG/ML
INJECTION, SOLUTION EPIDURAL; INFILTRATION; INTRACAUDAL; PERINEURAL AS NEEDED
Status: DISCONTINUED | OUTPATIENT
Start: 2024-10-15 | End: 2024-10-15 | Stop reason: SURG

## 2024-10-15 RX ADMIN — PROPOFOL 250 MCG/KG/MIN: 10 INJECTION, EMULSION INTRAVENOUS at 10:23

## 2024-10-15 RX ADMIN — PROPOFOL 110 MG: 10 INJECTION, EMULSION INTRAVENOUS at 10:22

## 2024-10-15 RX ADMIN — PROPOFOL 20 MG: 10 INJECTION, EMULSION INTRAVENOUS at 11:02

## 2024-10-15 RX ADMIN — SODIUM CHLORIDE, POTASSIUM CHLORIDE, SODIUM LACTATE AND CALCIUM CHLORIDE 30 ML/HR: 600; 310; 30; 20 INJECTION, SOLUTION INTRAVENOUS at 08:37

## 2024-10-15 RX ADMIN — LIDOCAINE HYDROCHLORIDE 50 MG: 20 INJECTION, SOLUTION EPIDURAL; INFILTRATION; INTRACAUDAL; PERINEURAL at 10:22

## 2024-10-15 RX ADMIN — EPHEDRINE SULFATE 10 MG: 50 INJECTION INTRAVENOUS at 10:55

## 2024-10-15 RX ADMIN — SODIUM CHLORIDE, POTASSIUM CHLORIDE, SODIUM LACTATE AND CALCIUM CHLORIDE: 600; 310; 30; 20 INJECTION, SOLUTION INTRAVENOUS at 11:19

## 2024-10-15 RX ADMIN — PROPOFOL 40 MG: 10 INJECTION, EMULSION INTRAVENOUS at 10:26

## 2024-10-17 LAB
BACTERIA SPEC AEROBE CULT: NO GROWTH
BACTERIA SPEC RESP CULT: NORMAL
GRAM STN SPEC: NORMAL

## 2024-10-18 LAB
CYTO UR: NORMAL
CYTO UR: NORMAL
LAB AP CASE REPORT: NORMAL
LAB AP CASE REPORT: NORMAL
LAB AP CLINICAL INFORMATION: NORMAL
LAB AP CLINICAL INFORMATION: NORMAL
LAB AP DIAGNOSIS COMMENT: NORMAL
LAB AP DIAGNOSIS COMMENT: NORMAL
LAB AP SPECIAL STAINS: NORMAL
LAB AP SPECIAL STAINS: NORMAL
PATH REPORT.FINAL DX SPEC: NORMAL
PATH REPORT.FINAL DX SPEC: NORMAL
PATH REPORT.GROSS SPEC: NORMAL
PATH REPORT.GROSS SPEC: NORMAL

## 2024-10-20 LAB
FUNGUS WND CULT: NORMAL
MYCOBACTERIUM SPEC CULT: NORMAL
NIGHT BLUE STAIN TISS: NORMAL
NIGHT BLUE STAIN TISS: NORMAL

## 2024-10-21 ENCOUNTER — OFFICE VISIT (OUTPATIENT)
Dept: PULMONOLOGY | Facility: CLINIC | Age: 79
End: 2024-10-21
Payer: MEDICARE

## 2024-10-21 VITALS
OXYGEN SATURATION: 98 % | RESPIRATION RATE: 16 BRPM | HEART RATE: 68 BPM | TEMPERATURE: 97.8 F | WEIGHT: 132 LBS | DIASTOLIC BLOOD PRESSURE: 77 MMHG | BODY MASS INDEX: 21.99 KG/M2 | HEIGHT: 65 IN | SYSTOLIC BLOOD PRESSURE: 148 MMHG

## 2024-10-21 DIAGNOSIS — R93.89 ABNORMAL CT OF THE CHEST: ICD-10-CM

## 2024-10-21 DIAGNOSIS — F17.211 NICOTINE DEPENDENCE, CIGARETTES, IN REMISSION: ICD-10-CM

## 2024-10-21 DIAGNOSIS — R91.8 MULTIPLE LUNG NODULES ON CT: ICD-10-CM

## 2024-10-21 DIAGNOSIS — C34.91 PRIMARY MALIGNANT NEOPLASM OF RIGHT LUNG METASTATIC TO OTHER SITE: Primary | ICD-10-CM

## 2024-10-21 PROCEDURE — 1160F RVW MEDS BY RX/DR IN RCRD: CPT | Performed by: NURSE PRACTITIONER

## 2024-10-21 PROCEDURE — 1159F MED LIST DOCD IN RCRD: CPT | Performed by: NURSE PRACTITIONER

## 2024-10-21 PROCEDURE — 99214 OFFICE O/P EST MOD 30 MIN: CPT | Performed by: NURSE PRACTITIONER

## 2024-10-22 ENCOUNTER — HOSPITAL ENCOUNTER (OUTPATIENT)
Dept: MRI IMAGING | Facility: HOSPITAL | Age: 79
Discharge: HOME | End: 2024-10-22
Admitting: NURSE PRACTITIONER
Payer: COMMERCIAL

## 2024-10-22 DIAGNOSIS — R93.89 ABNORMAL CT OF THE CHEST: ICD-10-CM

## 2024-10-22 DIAGNOSIS — C34.91 PRIMARY MALIGNANT NEOPLASM OF RIGHT LUNG METASTATIC TO OTHER SITE: ICD-10-CM

## 2024-10-22 PROCEDURE — 70553 MRI BRAIN STEM W/O & W/DYE: CPT

## 2024-10-22 PROCEDURE — A9577 INJ MULTIHANCE: HCPCS | Performed by: NURSE PRACTITIONER

## 2024-10-22 PROCEDURE — 0 GADOBENATE DIMEGLUMINE 529 MG/ML SOLUTION: Performed by: NURSE PRACTITIONER

## 2024-10-22 RX ADMIN — GADOBENATE DIMEGLUMINE 12 ML: 529 INJECTION, SOLUTION INTRAVENOUS at 18:04

## 2024-10-23 ENCOUNTER — CONSULT (OUTPATIENT)
Dept: ONCOLOGY | Facility: HOSPITAL | Age: 79
End: 2024-10-23
Payer: COMMERCIAL

## 2024-10-23 ENCOUNTER — LAB (OUTPATIENT)
Dept: ONCOLOGY | Facility: HOSPITAL | Age: 79
End: 2024-10-23
Payer: COMMERCIAL

## 2024-10-23 VITALS
WEIGHT: 132.2 LBS | OXYGEN SATURATION: 97 % | TEMPERATURE: 98.7 F | HEIGHT: 65 IN | BODY MASS INDEX: 22.02 KG/M2 | SYSTOLIC BLOOD PRESSURE: 119 MMHG | HEART RATE: 80 BPM | DIASTOLIC BLOOD PRESSURE: 55 MMHG | RESPIRATION RATE: 16 BRPM

## 2024-10-23 DIAGNOSIS — C34.91 ADENOCARCINOMA OF RIGHT LUNG: ICD-10-CM

## 2024-10-23 DIAGNOSIS — C34.91 ADENOCARCINOMA OF RIGHT LUNG: Primary | ICD-10-CM

## 2024-10-23 LAB
ALBUMIN SERPL-MCNC: 3.7 G/DL (ref 3.5–5.2)
ALBUMIN/GLOB SERPL: 1.1 G/DL
ALP SERPL-CCNC: 80 U/L (ref 39–117)
ALT SERPL W P-5'-P-CCNC: 6 U/L (ref 1–33)
ANION GAP SERPL CALCULATED.3IONS-SCNC: 10.5 MMOL/L (ref 5–15)
AST SERPL-CCNC: 15 U/L (ref 1–32)
BASOPHILS # BLD AUTO: 0.03 10*3/MM3 (ref 0–0.2)
BASOPHILS NFR BLD AUTO: 0.3 % (ref 0–1.5)
BILIRUB SERPL-MCNC: 0.2 MG/DL (ref 0–1.2)
BUN SERPL-MCNC: 24 MG/DL (ref 8–23)
BUN/CREAT SERPL: 20.9 (ref 7–25)
CALCIUM SPEC-SCNC: 9.1 MG/DL (ref 8.6–10.5)
CEA SERPL-MCNC: 72.1 NG/ML
CHLORIDE SERPL-SCNC: 103 MMOL/L (ref 98–107)
CO2 SERPL-SCNC: 25.5 MMOL/L (ref 22–29)
CREAT SERPL-MCNC: 1.15 MG/DL (ref 0.57–1)
DEPRECATED RDW RBC AUTO: 46.8 FL (ref 37–54)
EGFRCR SERPLBLD CKD-EPI 2021: 48.9 ML/MIN/1.73
EOSINOPHIL # BLD AUTO: 0.36 10*3/MM3 (ref 0–0.4)
EOSINOPHIL NFR BLD AUTO: 4.1 % (ref 0.3–6.2)
ERYTHROCYTE [DISTWIDTH] IN BLOOD BY AUTOMATED COUNT: 14.1 % (ref 12.3–15.4)
FERRITIN SERPL-MCNC: 297 NG/ML (ref 13–150)
GLOBULIN UR ELPH-MCNC: 3.5 GM/DL
GLUCOSE SERPL-MCNC: 148 MG/DL (ref 65–99)
HCT VFR BLD AUTO: 28.4 % (ref 34–46.6)
HGB BLD-MCNC: 8.7 G/DL (ref 12–15.9)
IMM GRANULOCYTES # BLD AUTO: 0.03 10*3/MM3 (ref 0–0.05)
IMM GRANULOCYTES NFR BLD AUTO: 0.3 % (ref 0–0.5)
IRON 24H UR-MRATE: 31 MCG/DL (ref 37–145)
IRON SATN MFR SERPL: 11 % (ref 20–50)
LYMPHOCYTES # BLD AUTO: 1.86 10*3/MM3 (ref 0.7–3.1)
LYMPHOCYTES NFR BLD AUTO: 21.4 % (ref 19.6–45.3)
MCH RBC QN AUTO: 27.9 PG (ref 26.6–33)
MCHC RBC AUTO-ENTMCNC: 30.6 G/DL (ref 31.5–35.7)
MCV RBC AUTO: 91 FL (ref 79–97)
MONOCYTES # BLD AUTO: 0.75 10*3/MM3 (ref 0.1–0.9)
MONOCYTES NFR BLD AUTO: 8.6 % (ref 5–12)
NEUTROPHILS NFR BLD AUTO: 5.68 10*3/MM3 (ref 1.7–7)
NEUTROPHILS NFR BLD AUTO: 65.3 % (ref 42.7–76)
NRBC BLD AUTO-RTO: 0 /100 WBC (ref 0–0.2)
PLATELET # BLD AUTO: 368 10*3/MM3 (ref 140–450)
PMV BLD AUTO: 9.4 FL (ref 6–12)
POTASSIUM SERPL-SCNC: 4.6 MMOL/L (ref 3.5–5.2)
PROT SERPL-MCNC: 7.2 G/DL (ref 6–8.5)
RBC # BLD AUTO: 3.12 10*6/MM3 (ref 3.77–5.28)
SODIUM SERPL-SCNC: 139 MMOL/L (ref 136–145)
TIBC SERPL-MCNC: 295 MCG/DL (ref 298–536)
TRANSFERRIN SERPL-MCNC: 198 MG/DL (ref 200–360)
WBC NRBC COR # BLD AUTO: 8.71 10*3/MM3 (ref 3.4–10.8)

## 2024-10-23 PROCEDURE — 36415 COLL VENOUS BLD VENIPUNCTURE: CPT

## 2024-10-23 PROCEDURE — 84466 ASSAY OF TRANSFERRIN: CPT

## 2024-10-23 PROCEDURE — 82728 ASSAY OF FERRITIN: CPT

## 2024-10-23 PROCEDURE — G0463 HOSPITAL OUTPT CLINIC VISIT: HCPCS | Performed by: INTERNAL MEDICINE

## 2024-10-23 PROCEDURE — 82378 CARCINOEMBRYONIC ANTIGEN: CPT

## 2024-10-23 PROCEDURE — 80053 COMPREHEN METABOLIC PANEL: CPT

## 2024-10-23 PROCEDURE — 83540 ASSAY OF IRON: CPT

## 2024-10-23 PROCEDURE — 85025 COMPLETE CBC W/AUTO DIFF WBC: CPT

## 2024-10-24 LAB — TEMPUS PORTAL: NORMAL

## 2024-10-25 LAB
CYTO UR: NORMAL
LAB AP CASE REPORT: NORMAL
LAB AP CLINICAL INFORMATION: NORMAL
LAB AP DIAGNOSIS COMMENT: NORMAL
LAB AP SPECIAL STAINS: NORMAL
Lab: NORMAL
PATH REPORT.ADDENDUM SPEC: NORMAL
PATH REPORT.FINAL DX SPEC: NORMAL
PATH REPORT.GROSS SPEC: NORMAL

## 2024-10-29 ENCOUNTER — PATIENT MESSAGE (OUTPATIENT)
Dept: ONCOLOGY | Facility: HOSPITAL | Age: 79
End: 2024-10-29
Payer: COMMERCIAL

## 2024-10-29 PROBLEM — K90.49 MALABSORPTION DUE TO INTOLERANCE, NOT ELSEWHERE CLASSIFIED: Status: ACTIVE | Noted: 2024-10-29

## 2024-10-29 PROBLEM — D50.9 IRON DEFICIENCY ANEMIA: Status: ACTIVE | Noted: 2024-10-29

## 2024-11-03 LAB
QT INTERVAL: 450 MS
QTC INTERVAL: 491 MS

## 2024-11-04 LAB
DNA RANGE(S) EXAMINED NAR: NORMAL
GENE DIS ANL INTERP-IMP: POSITIVE
GENE DIS ASSESSED: NORMAL
REASON FOR STUDY: NORMAL
TEMPUS BLOOD TUMOR MUTATIONAL BURDEN: 10.5 M/MB
TEMPUS LCA: NORMAL
TEMPUS MSI NOTE: NORMAL
TEMPUS PORTAL: NORMAL
TEMPUS THERAPY1: NORMAL
TEMPUS THERAPY2: NORMAL
TEMPUS THERAPYCOUNT: 2
TEMPUS TRIALCOUNT: 3
TEMPUS TRIALMATCHES1: NORMAL
TEMPUS TRIALMATCHES2: NORMAL
TEMPUS TRIALMATCHES3: NORMAL

## 2024-11-06 LAB
CYTO UR: NORMAL
LAB AP CASE REPORT: NORMAL
LAB AP CLINICAL INFORMATION: NORMAL
LAB AP DIAGNOSIS COMMENT: NORMAL
LAB AP SPECIAL STAINS: NORMAL
Lab: NORMAL
Lab: NORMAL
PATH REPORT.ADDENDUM SPEC: NORMAL
PATH REPORT.FINAL DX SPEC: NORMAL
PATH REPORT.GROSS SPEC: NORMAL

## 2024-11-07 ENCOUNTER — CONSULT (OUTPATIENT)
Dept: RADIATION ONCOLOGY | Facility: HOSPITAL | Age: 79
End: 2024-11-07
Payer: COMMERCIAL

## 2024-11-07 VITALS
HEART RATE: 69 BPM | SYSTOLIC BLOOD PRESSURE: 116 MMHG | DIASTOLIC BLOOD PRESSURE: 80 MMHG | RESPIRATION RATE: 19 BRPM | OXYGEN SATURATION: 98 % | TEMPERATURE: 98.6 F | WEIGHT: 128.97 LBS | BODY MASS INDEX: 21.46 KG/M2

## 2024-11-07 DIAGNOSIS — C78.02 ADENOCARCINOMA METASTATIC TO BOTH LUNGS: Primary | ICD-10-CM

## 2024-11-07 DIAGNOSIS — R93.89 ABNORMAL CT OF THE CHEST: ICD-10-CM

## 2024-11-07 DIAGNOSIS — C78.01 ADENOCARCINOMA METASTATIC TO BOTH LUNGS: Primary | ICD-10-CM

## 2024-11-07 DIAGNOSIS — C34.91 PRIMARY MALIGNANT NEOPLASM OF RIGHT LUNG METASTATIC TO OTHER SITE: ICD-10-CM

## 2024-11-07 PROCEDURE — G0463 HOSPITAL OUTPT CLINIC VISIT: HCPCS | Performed by: RADIOLOGY

## 2024-11-07 NOTE — LETTER
November 7, 2024     TARA Dickens  [address] [address]    Patient: Manuelito Stratton   YOB: 1945   Date of Visit: 11/7/2024     Dear TARA Dickens:       Thank you for referring Manuelito Stratton to me for evaluation. Below are the relevant portions of my assessment and plan of care.    If you have questions, please do not hesitate to call me.         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  Ms. Stratton reports a history of weight loss generalized weakness and cough.  She was evaluated in the emergency department at [facility] on 9/30/2024 at which time CT scan of the chest was performed revealing evidence of right paratracheal adenopathy and multiple bilateral pulmonary nodules.  CT scan of the chest revealed multiple bilateral pulmonary nodules ranging in size from 5 mm to 25 mm concerning for metastatic disease.  Additionally, there was mediastinal and right hilar adenopathy.  There was a hypodense lesion within the caudal right hepatic lobe measuring 4 cm and a conglomerate of abdominal and retroperitoneal lymph nodes.  A 5.8 mm sclerotic focus within the T5 vertebral body was noted.  Ms. Stratton reports progressive fatigue but denies hematochezia, dyschezia, nausea, emesis, changes in breathing or hemoptysis.  He does have a long history of smoking but stopped smoking approximately 20 years ago.  She did undergo  biopsy of the right upper lobe with pathology revealing adenocarcinoma; TTF-1 negative, Napsin A negative, CK5/6 negative and p40 negative.  Tumor cells did stain positive for CK7.    Subjective      Review of Systems: Review of Systems   Constitutional:  Positive for appetite change (decreased), fatigue (4/10) and unexpected weight change (5lb wt loss in last month).   HENT:  Positive for hearing loss and tinnitus. Negative for sore throat and trouble swallowing.    Eyes:  Positive for visual disturbance (decreased visual ability).   Respiratory:  Negative for cough and shortness of breath.    Cardiovascular:  Negative for chest pain, palpitations and leg swelling.   Gastrointestinal:  Positive for constipation (occasional). Negative for anal bleeding, blood in stool, diarrhea, nausea and rectal pain.   Genitourinary:  Negative for difficulty urinating, dysuria, frequency and urgency.   Musculoskeletal:  Positive for back pain (ocassional). Negative for arthralgias and gait problem.   Skin:  Negative for rash.   Neurological:  Positive for dizziness (occasional) and weakness (occasional when doesnt eat much). Negative for headaches.   Psychiatric/Behavioral:  Negative for agitation, self-injury, sleep disturbance and suicidal ideas. The patient is nervous/anxious.        Past Medical History:   Past Medical History:   Diagnosis Date   • Depression    • Hypertension    • Lung cancer        Past Surgical History:   Past Surgical History:   Procedure Laterality Date   • BRONCHOSCOPY N/A 10/15/2024    Procedure: BRONCHOSCOPY WITH ENDOBRONCHIAL ULTRASOUND W/ FNA, BAL, BIOPSIES;  Surgeon: Corey Naik MD;  Location: [facility] ENDOSCOPY;  Service: Pulmonary;  Laterality: N/A;  LUNG NODULES, MEDIASTINAL LYMPHADENOPATHY       Family History:   Family History   Problem Relation Age of Onset   • Cancer Other         son       Social History:   Social History     Socioeconomic History   • Marital status: Single   Tobacco Use    • Smoking status: Former     Current packs/day: 0.00     Average packs/day: 0.5 packs/day for 20.0 years (10.0 ttl pk-yrs)     Types: Cigarettes     Start date:      Quit date:      Years since quittin.8   • Smokeless tobacco: Never   Vaping Use   • Vaping status: Never Used   Substance and Sexual Activity   • Alcohol use: Not Currently   • Drug use: Never   • Sexual activity: Defer       Medications:     Current Outpatient Medications:   •  citalopram (CeleXA) 10 MG tablet, Take 0.5 tablets by mouth Daily. (Patient taking differently: Take 1 tablet by mouth Daily.), Disp: , Rfl:   •  ferrous gluconate (FERGON) 324 MG tablet, Take 1 tablet by mouth Every 12 (Twelve) Hours., Disp: , Rfl:   •  carvedilol (COREG) 6.25 MG tablet, Take 1 tablet by mouth 2 (Two) Times a Day With Meals for 30 days., Disp: 60 tablet, Rfl: 0    Allergies:   No Known Allergies    Pain: (on a scale of 0-10)   Pain Score    24 1053   PainSc: 0-No pain       Arbor TAMARA Stratton reports a pain score of 0.  Given her pain assessment as noted, treatment options were discussed and the following options were decided upon as a follow-up plan to address the patient's pain: continuation of current treatment plan for pain.     Advanced Care Plan: N   Quality of Life: 90 - Limited Activity    Objective     Physical Exam:   Vital Signs:   Vitals:    24 1053   BP: 116/80   Pulse: 69   Resp: 19   Temp: 98.6 °F (37 °C)   TempSrc: Temporal   SpO2: 98%   Weight: 58.5 kg (128 lb 15.5 oz)   PainSc: 0-No pain     Body mass index is 21.46 kg/m².     Physical Exam  Constitutional:       General: She is not in acute distress.     Appearance: She is normal weight. She is not toxic-appearing.   HENT:      Head: Normocephalic and atraumatic.   Pulmonary:      Effort: Pulmonary effort is normal. No respiratory distress.   Skin:     General: Skin is warm and dry.   Neurological:      General: No focal deficit present.      Mental Status: She is alert  "and oriented to person, place, and time.      Cranial Nerves: No cranial nerve deficit.   Psychiatric:         Mood and Affect: Mood normal.         Behavior: Behavior normal.         Judgment: Judgment normal.         Results:   Radiographs: MRI Brain With & Without Contrast    Result Date: 10/22/2024  Impression: Study is degraded by motion artifact. Within technical limitation: No acute intracranial finding. No abnormal intracranial enhancement. Of note, volumetric/MPRAGE postcontrast sequence was unable to be obtained secondary to patient motion. Electronically Signed: Rene Trent  10/22/2024 6:18 PM EDT  Workstation ID: [id number]    NM Bone Scan Whole Body    Result Date: 10/8/2024  1. Areas of abnormal uptake within the T5 vertebral body and sacrum suspicious for metastatic disease. Electronically Signed: Marvin Jenkins MD  10/8/2024 1:04 PM EDT  Workstation ID: [id number]   I personally reviewed the CT scan of the chest from 9/30/2024 as well as the MRI brain from 10/22/2024 and the bone scan from 10/8/2024.  The pertinent findings are as above.    Pathology: Personally reviewed the pathology report from the procedure performed on 10/15/2024.  The pertinent findings are as above in HPI.      Labs:   WBC   Date Value Ref Range Status   10/23/2024 8.71 3.40 - 10.80 10*3/mm3 Final     Hemoglobin   Date Value Ref Range Status   10/23/2024 8.7 (L) 12.0 - 15.9 g/dL Final     Hematocrit   Date Value Ref Range Status   10/23/2024 28.4 (L) 34.0 - 46.6 % Final     Platelets   Date Value Ref Range Status   10/23/2024 368 140 - 450 10*3/mm3 Final    No results found for: \"PSA\"   CEA   Date Value Ref Range Status   10/23/2024 72.10 ng/mL Final        Assessment / Plan      Assessment/Plan:   Manuelito Stratton is a 79-year-old female with metastatic non-small cell carcinoma of unknown origin.  There is high suspicion for a hepatobiliary primary given the presence of a 4 cm hepatic mass and retroperitoneal adenopathy with rounded " bilateral lung metastases in the absence of a dominant lung mass. ECOG 1    I discussed the clinical, radiographic and pathologic findings to date with Ms. Stratton.  I explained the role of radiotherapy in the management of metastatic carcinoma.  I explained that it is uncertain as to the origin of her metastatic malignancy given the pathologic findings.  She will undergo repeat evaluation by Dr. Valentin regarding systemic therapy.  I recommended repeat follow-up with radiation oncology in 2 months given potential for progression of disease resulting in localized symptoms that may be amenable to palliation with external beam radiotherapy.  She was encouraged to contact me in the interim with any questions or concerns regarding her care.        Jaspreet Reardon MD  Radiation Oncology  [facility]    This document has been signed by Jaspreet Reardon MD on November 7, 2024 11:36 EST

## 2024-11-07 NOTE — PROGRESS NOTES
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  Ms. Stratton reports a history of weight loss generalized weakness and cough.  She was evaluated in the emergency department at [facility] on 9/30/2024 at which time CT scan of the chest was performed revealing evidence of right paratracheal adenopathy and multiple bilateral pulmonary nodules.  CT scan of the chest revealed multiple bilateral pulmonary nodules ranging in size from 5 mm to 25 mm concerning for metastatic disease.  Additionally, there was mediastinal and right hilar adenopathy.  There was a hypodense lesion within the caudal right hepatic lobe measuring 4 cm and a conglomerate of abdominal and retroperitoneal lymph nodes.  A 5.8 mm sclerotic focus within the T5 vertebral body was noted.  Ms. Stratton reports progressive fatigue but denies hematochezia, dyschezia, nausea, emesis, changes in breathing or hemoptysis.  He does have a long history of smoking but stopped smoking approximately 20 years ago.  She did undergo biopsy of the right upper lobe with pathology revealing adenocarcinoma; TTF-1 negative, Napsin A negative, CK5/6 negative and p40 negative.  Tumor cells did stain positive for CK7.    Subjective      Review of Systems: Review of Systems   Constitutional:  Positive for appetite change (decreased), fatigue (4/10) and unexpected weight change (5lb wt loss in last month).   HENT:  Positive for hearing loss and tinnitus. Negative for sore throat and trouble swallowing.    Eyes:  Positive for visual disturbance (decreased visual  ability).   Respiratory:  Negative for cough and shortness of breath.    Cardiovascular:  Negative for chest pain, palpitations and leg swelling.   Gastrointestinal:  Positive for constipation (occasional). Negative for anal bleeding, blood in stool, diarrhea, nausea and rectal pain.   Genitourinary:  Negative for difficulty urinating, dysuria, frequency and urgency.   Musculoskeletal:  Positive for back pain (ocassional). Negative for arthralgias and gait problem.   Skin:  Negative for rash.   Neurological:  Positive for dizziness (occasional) and weakness (occasional when doesnt eat much). Negative for headaches.   Psychiatric/Behavioral:  Negative for agitation, self-injury, sleep disturbance and suicidal ideas. The patient is nervous/anxious.        Past Medical History:   Past Medical History:   Diagnosis Date    Depression     Hypertension     Lung cancer        Past Surgical History:   Past Surgical History:   Procedure Laterality Date    BRONCHOSCOPY N/A 10/15/2024    Procedure: BRONCHOSCOPY WITH ENDOBRONCHIAL ULTRASOUND W/ FNA, BAL, BIOPSIES;  Surgeon: Corey Naik MD;  Location: [facility] ENDOSCOPY;  Service: Pulmonary;  Laterality: N/A;  LUNG NODULES, MEDIASTINAL LYMPHADENOPATHY       Family History:   Family History   Problem Relation Age of Onset    Cancer Other         son       Social History:   Social History     Socioeconomic History    Marital status: Single   Tobacco Use    Smoking status: Former     Current packs/day: 0.00     Average packs/day: 0.5 packs/day for 20.0 years (10.0 ttl pk-yrs)     Types: Cigarettes     Start date:      Quit date: 2004     Years since quittin.8    Smokeless tobacco: Never   Vaping Use    Vaping status: Never Used   Substance and Sexual Activity    Alcohol use: Not Currently    Drug use: Never    Sexual activity: Defer       Medications:     Current Outpatient Medications:     citalopram (CeleXA) 10 MG tablet, Take 0.5 tablets by mouth Daily. (Patient taking  differently: Take 1 tablet by mouth Daily.), Disp: , Rfl:     ferrous gluconate (FERGON) 324 MG tablet, Take 1 tablet by mouth Every 12 (Twelve) Hours., Disp: , Rfl:     carvedilol (COREG) 6.25 MG tablet, Take 1 tablet by mouth 2 (Two) Times a Day With Meals for 30 days., Disp: 60 tablet, Rfl: 0    Allergies:   No Known Allergies    Pain: (on a scale of 0-10)   Pain Score    11/07/24 1053   PainSc: 0-No pain       Arbor TAMARA Stratton reports a pain score of 0.  Given her pain assessment as noted, treatment options were discussed and the following options were decided upon as a follow-up plan to address the patient's pain: continuation of current treatment plan for pain.     Advanced Care Plan: N   Quality of Life: 90 - Limited Activity    Objective     Physical Exam:   Vital Signs:   Vitals:    11/07/24 1053   BP: 116/80   Pulse: 69   Resp: 19   Temp: 98.6 °F (37 °C)   TempSrc: Temporal   SpO2: 98%   Weight: 58.5 kg (128 lb 15.5 oz)   PainSc: 0-No pain     Body mass index is 21.46 kg/m².     Physical Exam  Constitutional:       General: She is not in acute distress.     Appearance: She is normal weight. She is not toxic-appearing.   HENT:      Head: Normocephalic and atraumatic.   Pulmonary:      Effort: Pulmonary effort is normal. No respiratory distress.   Skin:     General: Skin is warm and dry.   Neurological:      General: No focal deficit present.      Mental Status: She is alert and oriented to person, place, and time.      Cranial Nerves: No cranial nerve deficit.   Psychiatric:         Mood and Affect: Mood normal.         Behavior: Behavior normal.         Judgment: Judgment normal.         Results:   Radiographs: MRI Brain With & Without Contrast    Result Date: 10/22/2024  Impression: Study is degraded by motion artifact. Within technical limitation: No acute intracranial finding. No abnormal intracranial enhancement. Of note, volumetric/MPRAGE postcontrast sequence was unable to be obtained secondary to  "patient motion. Electronically Signed: Rene Trent  10/22/2024 6:18 PM EDT  Workstation ID: [id number]    NM Bone Scan Whole Body    Result Date: 10/8/2024  1. Areas of abnormal uptake within the T5 vertebral body and sacrum suspicious for metastatic disease. Electronically Signed: Marvin Jenkins MD  10/8/2024 1:04 PM EDT  Workstation ID: [id number]   I personally reviewed the CT scan of the chest from 9/30/2024 as well as the MRI brain from 10/22/2024 and the bone scan from 10/8/2024.  The pertinent findings are as above.    Pathology: Personally reviewed the pathology report from the procedure performed on 10/15/2024.  The pertinent findings are as above in HPI.      Labs:   WBC   Date Value Ref Range Status   10/23/2024 8.71 3.40 - 10.80 10*3/mm3 Final     Hemoglobin   Date Value Ref Range Status   10/23/2024 8.7 (L) 12.0 - 15.9 g/dL Final     Hematocrit   Date Value Ref Range Status   10/23/2024 28.4 (L) 34.0 - 46.6 % Final     Platelets   Date Value Ref Range Status   10/23/2024 368 140 - 450 10*3/mm3 Final    No results found for: \"PSA\"   CEA   Date Value Ref Range Status   10/23/2024 72.10 ng/mL Final        Assessment / Plan      Assessment/Plan:   Manuelito Stratton is a 79-year-old female with metastatic non-small cell carcinoma of unknown origin.  There is high suspicion for a hepatobiliary primary given the presence of a 4 cm hepatic mass and retroperitoneal adenopathy with rounded bilateral lung metastases in the absence of a dominant lung mass. ECOG 1    I discussed the clinical, radiographic and pathologic findings to date with Ms. Stratton.  I explained the role of radiotherapy in the management of metastatic carcinoma.  I explained that it is uncertain as to the origin of her metastatic malignancy given the pathologic findings.  She will undergo repeat evaluation by Dr. Valentin regarding systemic therapy.  I recommended repeat follow-up with radiation oncology in 2 months given potential for progression of " disease resulting in localized symptoms that may be amenable to palliation with external beam radiotherapy.  She was encouraged to contact me in the interim with any questions or concerns regarding her care.        Jaspreet Reardon MD  Radiation Oncology  [facility]    This document has been signed by Jaspreet Reardon MD on November 7, 2024 11:36 EST

## 2024-11-11 ENCOUNTER — TELEPHONE (OUTPATIENT)
Dept: ONCOLOGY | Facility: HOSPITAL | Age: 79
End: 2024-11-11
Payer: COMMERCIAL

## 2024-11-11 NOTE — TELEPHONE ENCOUNTER
Distress Screening Follow-Up    Date of Distress Screening: 10/23/24 and 24    Location of Distress Screening: Medical oncology and radiation oncology    Distress Level: 4 and 8    Problems Indicated: Changes in eating, worry or anxiety, sadness or depression, loss of interest or enjoyment, grief or loss, relationship with friends or coworkers, finances    Diagnosis: Suspected stage IV non-small cell lung cancer     Current Tx Plan: Ms. Stratton is scheduled to undergo CT abdomen/pelvis to complete staging and then follow up with the medical oncologist to discuss systemic therapy.    Most Recent PHQ -2/PHQ-9 Score: 3 (24)    Most Recent C-SSRS: Denied SI and screened negative (24)    Mental Health Treatment: Ms. Stratton has a history of anxiety/depression, currently on Celexa.    Substance Use/Tobacco Use: Ms. Stratton is a former cigarette smoker, quitting 20 years ago. She has no history of alcohol dependence or illicit drug use noted.    Support System: Ms. Stratton receives support from daughter-in-law, Lorraine Carina. Lorraine resides close by to her and tries to attend as many appointments with her as she can, however, she works two jobs herself. Lorraine's spouse/Ms. Stratton's son  on in 2023 from esophageal cancer. Ms. Stratton has two daughters and one living son, however, her daughter-in-law is the only one that knows of her cancer diagnosis. Ms. Stratton's daughters do not reside local. Her son resides in [place].    Residential status/Who lives in the home: Ms. Stratton resides by herself in her home in [place].    Home Care Needs: Overall, Ms. Stratton is fairly independent with her ADLs. ECOG scores of 0 and 1 noted on file. No home care needs identified at this time.    Insurance: Ms. Stratton carries health insurance through her employer as well as Medicare A and B.    Finances: Ms. Stratton is gainfully employed full-time at [employer], working alongside therapy. Advised that OSW  can help coordinate any requests for FMLA, short term disability benefits, etc., as well as get Ms. Stratton connected to resource(s) to help address any loss of income and help meet her basic needs and monthly living expenses if needed.    Transportation: Ms. Stratton provides her own transportation, however, her daughter-in-law assists as well and tries to attend as many visits with her as she can.    Advance Care Planning: No directive on file.    Resources/Referrals: Emotional support    Additional Comment: OSW attempted to contact Ms. Stratton via telephone this morning to follow-up in regards to her high distress, introduce OSW role and assess for psychosocial needs. OSW left a VM on the primary phone number listed, which appears to belong to Lorraine Lim, briefly advising that OSW is calling to help address any barriers to care or unmet physical, emotional, social, practical and/or spiritual needs. Provided my direct number where they may reach me back at their convenience. OSW support remains available.    OSW received a return phone call from Lorraine Lim, Ms. Stratton's daughter-in-law. Lorraine did not identify any unmet needs or barriers to care at this time. Encouraged OSW support remains available and will continue to follow treatment plans for ongoing support. She expressed appreciation and will relay to Ms. Stratton that OSW support is available.

## 2024-11-14 ENCOUNTER — TELEPHONE (OUTPATIENT)
Dept: ONCOLOGY | Facility: HOSPITAL | Age: 79
End: 2024-11-14
Payer: COMMERCIAL

## 2024-11-17 LAB
DNA RANGE(S) EXAMINED NAR: NORMAL
GENE DIS ANL INTERP-IMP: POSITIVE
GENE DIS ASSESSED: NORMAL
GENE MUT TESTED BLD/T: 8.4 M/MB
MSI CA SPEC-IMP: NORMAL
REASON FOR STUDY: NORMAL
TEMPUS GERMLINE NOTE: NORMAL
TEMPUS LCA: NORMAL
TEMPUS PORTAL: NORMAL
TEMPUS THERAPY1: NORMAL
TEMPUS THERAPY2: NORMAL
TEMPUS THERAPYCOUNT: 2
TEMPUS TRIALCOUNT: 3
TEMPUS TRIALMATCHES1: NORMAL
TEMPUS TRIALMATCHES2: NORMAL
TEMPUS TRIALMATCHES3: NORMAL
TEMPUS XR RESULT 1: NORMAL

## 2024-11-19 LAB
MYCOBACTERIUM SPEC CULT: NORMAL
NIGHT BLUE STAIN TISS: NORMAL
NIGHT BLUE STAIN TISS: NORMAL

## 2024-11-26 LAB
MYCOBACTERIUM SPEC CULT: NORMAL
NIGHT BLUE STAIN TISS: NORMAL
NIGHT BLUE STAIN TISS: NORMAL

## 2024-11-29 ENCOUNTER — HOSPITAL ENCOUNTER (OUTPATIENT)
Dept: RESPIRATORY THERAPY | Facility: HOSPITAL | Age: 79
Discharge: HOME | End: 2024-11-29
Payer: COMMERCIAL

## 2024-11-29 DIAGNOSIS — C34.91 PRIMARY MALIGNANT NEOPLASM OF RIGHT LUNG METASTATIC TO OTHER SITE: ICD-10-CM

## 2024-11-29 DIAGNOSIS — R93.89 ABNORMAL CT OF THE CHEST: ICD-10-CM

## 2024-11-29 PROCEDURE — 94060 EVALUATION OF WHEEZING: CPT

## 2024-11-29 PROCEDURE — 94618 PULMONARY STRESS TESTING: CPT

## 2024-11-29 PROCEDURE — 94726 PLETHYSMOGRAPHY LUNG VOLUMES: CPT

## 2024-11-29 PROCEDURE — 94729 DIFFUSING CAPACITY: CPT

## 2024-11-29 RX ORDER — ALBUTEROL SULFATE 0.83 MG/ML
2.5 SOLUTION RESPIRATORY (INHALATION) ONCE
Status: COMPLETED | OUTPATIENT
Start: 2024-11-29 | End: 2024-11-29

## 2024-11-29 RX ADMIN — ALBUTEROL SULFATE 2.5 MG: 2.5 SOLUTION RESPIRATORY (INHALATION) at 14:30

## 2024-11-29 NOTE — PROGRESS NOTES
Exercise Oximetry    Patient Name:Manuelito Stratton   MRN: [id number]   Date: 11/29/24             ROOM AIR BASELINE   SpO2% 99   Heart Rate 75   Blood Pressure 144/69     EXERCISE ON ROOM AIR SpO2% EXERCISE ON O2 @  LPM SpO2%   1 MINUTE 99 1 MINUTE    2 MINUTES 96 2 MINUTES    3 MINUTES 96 3 MINUTES    4 MINUTES 98 4 MINUTES    5 MINUTES 97 5 MINUTES    6 MINUTES 98 6 MINUTES               Distance Walked  720' Distance Walked   Dyspnea ([place] Scale)  Pre-0 Post-0 Dyspnea ([place] Scale)   Fatigue ([place] Scale)  1 Fatigue ([place] Scale)   SpO2% Post Exercise  98 SpO2% Post Exercise   HR Post Exercise  80 HR Post Exercise   Time to Recovery  NA Time to Recovery     Comments:

## 2024-12-02 ENCOUNTER — HOSPITAL ENCOUNTER (OUTPATIENT)
Dept: CT IMAGING | Facility: HOSPITAL | Age: 79
Discharge: HOME | End: 2024-12-02
Admitting: INTERNAL MEDICINE
Payer: COMMERCIAL

## 2024-12-02 DIAGNOSIS — C34.91 ADENOCARCINOMA OF RIGHT LUNG: ICD-10-CM

## 2024-12-02 LAB
CREAT BLDA-MCNC: 1 MG/DL (ref 0.6–1.3)
EGFRCR SERPLBLD CKD-EPI 2021: 57.8 ML/MIN/1.73

## 2024-12-02 PROCEDURE — 82565 ASSAY OF CREATININE: CPT

## 2024-12-02 PROCEDURE — 74177 CT ABD & PELVIS W/CONTRAST: CPT

## 2024-12-02 PROCEDURE — 25510000001 IOPAMIDOL PER 1 ML: Performed by: INTERNAL MEDICINE

## 2024-12-02 RX ORDER — IOPAMIDOL 755 MG/ML
100 INJECTION, SOLUTION INTRAVASCULAR
Status: COMPLETED | OUTPATIENT
Start: 2024-12-02 | End: 2024-12-02

## 2024-12-02 RX ADMIN — IOPAMIDOL 100 ML: 755 INJECTION, SOLUTION INTRAVENOUS at 08:54

## 2024-12-03 ENCOUNTER — TELEPHONE (OUTPATIENT)
Dept: ONCOLOGY | Facility: HOSPITAL | Age: 79
End: 2024-12-03
Payer: MEDICARE

## 2024-12-03 ENCOUNTER — OFFICE VISIT (OUTPATIENT)
Dept: PULMONOLOGY | Facility: CLINIC | Age: 79
End: 2024-12-03
Payer: MEDICARE

## 2024-12-03 VITALS
HEIGHT: 65 IN | RESPIRATION RATE: 16 BRPM | BODY MASS INDEX: 21.33 KG/M2 | SYSTOLIC BLOOD PRESSURE: 143 MMHG | TEMPERATURE: 97.8 F | DIASTOLIC BLOOD PRESSURE: 77 MMHG | HEART RATE: 71 BPM | OXYGEN SATURATION: 99 % | WEIGHT: 128 LBS

## 2024-12-03 DIAGNOSIS — I26.99 PULMONARY EMBOLISM, UNSPECIFIED CHRONICITY, UNSPECIFIED PULMONARY EMBOLISM TYPE, UNSPECIFIED WHETHER ACUTE COR PULMONALE PRESENT: Primary | ICD-10-CM

## 2024-12-03 DIAGNOSIS — R91.8 MULTIPLE LUNG NODULES ON CT: ICD-10-CM

## 2024-12-03 DIAGNOSIS — J98.4 RESTRICTIVE LUNG DISEASE: ICD-10-CM

## 2024-12-03 DIAGNOSIS — F17.211 NICOTINE DEPENDENCE, CIGARETTES, IN REMISSION: ICD-10-CM

## 2024-12-03 DIAGNOSIS — C34.90 METASTATIC NON-SMALL CELL LUNG CANCER: Primary | ICD-10-CM

## 2024-12-03 PROCEDURE — 1160F RVW MEDS BY RX/DR IN RCRD: CPT | Performed by: NURSE PRACTITIONER

## 2024-12-03 PROCEDURE — 1159F MED LIST DOCD IN RCRD: CPT | Performed by: NURSE PRACTITIONER

## 2024-12-03 PROCEDURE — 99214 OFFICE O/P EST MOD 30 MIN: CPT | Performed by: NURSE PRACTITIONER

## 2024-12-03 RX ORDER — ALBUTEROL SULFATE 90 UG/1
2 INHALANT RESPIRATORY (INHALATION) EVERY 4 HOURS PRN
Qty: 18 G | Refills: 5 | Status: CANCELLED | OUTPATIENT
Start: 2024-12-03

## 2024-12-03 RX ORDER — APIXABAN 5 MG (74)
5 KIT ORAL SEE ADMIN INSTRUCTIONS
Qty: 74 TABLET | Refills: 0 | Status: SHIPPED | OUTPATIENT
Start: 2024-12-03

## 2024-12-03 NOTE — TELEPHONE ENCOUNTER
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 Valentin wants her to take eliquis starter pack, pt daughter in law verbalizes understanding; prescription sent to preferred pharmacy; will see patient 12//5 as scheduled

## 2024-12-03 NOTE — PROGRESS NOTES
Primary Care Provider  Mauro Cross MD   Referring Provider  No ref. provider found    Patient Complaint  Follow-up and Results      Subjective       History of Presenting Illness  Manuelito Stratton is a pleasant 79 y.o. female who presents to [facility] for followup appointment.  Patient has diagnosis of metastatic non-small cell carcinoma.  She is under the care of of oncology and radiation oncology.  She is here to review her pulmonary function test and 6-minute walk results. Patient had her PFT 11/29/2024 which revealed moderate restrictive lung defect present with significant bronchodilator response noted.  Diffusion capacity severely reduced.  She passed her 6-minute walk with lowest saturation at 96% on room air.  Final results AFB and fungal cultures from her bronchoscopy on 10/15/2024 revealed no growth.She is currently not on any medications for her lungs.  Patient does not feel she needs any inhalers for her lungs at this time including a rescue inhaler.  Ms. Stratton reports a history of weight loss generalized weakness and cough.  This would also reports no shortness of air no cough no wheezing.  She just had a CT of her abdomen pelvis yesterday and was told she had blood clots in her lungs.  She was prescribed Eliquis and has not picked this up from the pharmacy yet.  Patient says she does have an incentive spirometer at home.    I have personally reviewed the review of systems, past family, social, medical and surgical histories; and agree with their findings.      Review of Systems   Constitutional: Negative.    HENT: Negative.     Respiratory: Negative.     Cardiovascular: Negative.    Musculoskeletal: Negative.    Neurological: Negative.    Psychiatric/Behavioral: Negative.           Family History   Problem Relation Age of Onset    Cancer Other         son        Social History     Socioeconomic History    Marital status: Single   Tobacco Use     "Smoking status: Former     Current packs/day: 0.00     Average packs/day: 0.5 packs/day for 20.0 years (10.0 ttl pk-yrs)     Types: Cigarettes     Start date:      Quit date:      Years since quittin.9    Smokeless tobacco: Never   Vaping Use    Vaping status: Never Used   Substance and Sexual Activity    Alcohol use: Not Currently    Drug use: Never    Sexual activity: Defer        Past Medical History:   Diagnosis Date    Depression     Hypertension     Lung cancer         Immunization History   Administered Date(s) Administered    COVID-19 (PFIZER) Purple Cap Monovalent 2020, 2021    Fluzone High-Dose 65+YRS 2017       No Known Allergies       Current Outpatient Medications:     Apixaban Starter Pack (Eliquis DVT/PE Starter Pack) tablet therapy pack, Take two 5 mg tablets by mouth every 12 hours for 7 days. Followed by one 5 mg tablet every 12 hours. (Dispense starter pack if available), Disp: 74 tablet, Rfl: 0    carvedilol (COREG) 6.25 MG tablet, Take 1 tablet by mouth 2 (Two) Times a Day With Meals for 30 days., Disp: 60 tablet, Rfl: 0    ferrous gluconate (FERGON) 324 MG tablet, Take 1 tablet by mouth Every 12 (Twelve) Hours., Disp: , Rfl:     citalopram (CeleXA) 10 MG tablet, Take 0.5 tablets by mouth Daily. (Patient taking differently: Take 1 tablet by mouth Daily.), Disp: , Rfl:          Vital Signs   /77 (BP Location: Left arm, Patient Position: Sitting, Cuff Size: Adult)   Pulse 71   Temp 97.8 °F (36.6 °C)   Resp 16   Ht 165.1 cm (65\")   Wt 58.1 kg (128 lb)   SpO2 99%   BMI 21.30 kg/m²       Objective     Physical Exam  Vitals reviewed.   Constitutional:       General: She is not in acute distress.     Appearance: Normal appearance. She is not ill-appearing.   HENT:      Head: Normocephalic and atraumatic.      Nose: Nose normal.      Mouth/Throat:      Mouth: Mucous membranes are moist.      Pharynx: Oropharynx is clear.   Eyes:      Extraocular Movements: " Extraocular movements intact.      Conjunctiva/sclera: Conjunctivae normal.      Pupils: Pupils are equal, round, and reactive to light.   Cardiovascular:      Rate and Rhythm: Normal rate and regular rhythm.      Pulses: Normal pulses.      Heart sounds: Normal heart sounds.   Pulmonary:      Effort: Pulmonary effort is normal. No respiratory distress.      Breath sounds: Normal breath sounds. No stridor. No wheezing, rhonchi or rales.   Abdominal:      General: Bowel sounds are normal.   Musculoskeletal:         General: Normal range of motion.      Cervical back: Normal range of motion and neck supple.   Skin:     General: Skin is warm and dry.   Neurological:      Mental Status: She is alert and oriented to person, place, and time.   Psychiatric:         Behavior: Behavior normal.         Results Review  I have personally reviewed the prior office notes, hospital records, labs, and diagnostics.    AFB Culture - Lavage, Lung, Right Upper Lobe  Order: [id number]  Status: Final result       Visible to patient: Yes (not seen)       Next appt: 12/03/2024 at 04:15 PM in Pulmonology (Alisson Chisholm, TARA)       Dx: Abnormal CT of the chest; Multiple maine...    Specimen Information: Lung, Right Upper Lobe; Lavage   0 Result Notes  AFB Culture No AFB isolated at 6 weeks            AFB Stain No acid fast bacilli seen on direct smear   No acid fast bacilli seen on concentrated smear           Resulting Agency: [facility]          Specimen Collected: 10/15/24 11:01 EDT Last Resulted: 11/26/24 10:45 EST      Fungus Culture - Lavage, Lung, Right Upper Lobe  Order: [id number]  Status: Final result       Visible to patient: Yes (not seen)       Next appt: 12/03/2024 at 04:15 PM in Pulmonology (Alisson Chisholm, TARA)       Dx: Abnormal CT of the chest; Multiple maine...    Specimen Information: Lung, Right Upper Lobe; Lavage   0 Result Notes  Fungus Culture No fungus isolated at 4 weeks        Resulting Agency: [facility]          Specimen  Collected: 10/15/24 11:01 EDT Last Resulted: 11/12/24 10:45 EST               File Link    Scan on 11/29/2024 1607 by Alisson Chisholm APRN: PULMONARY FUNCTION TEST, [facility], 11/29/2024        Key Information    Document ID File Type Document Type Description   [id number] Image PULMONARY FUNCTION TEST - SCAN PULMONARY FUNCTION TEST, [facility], 11/29/2024     Import Information    Attached At Date Time User Dept   Order Level 11/29/2024  4:07 PM Alisson Chisholm APRN      Order    Pulmonary Function Test [[id number]]     Encounter    Hospital Encounter on 11/29/24 with  [facility] ROOM 1       Results  Complete PFT - Pre & Post Bronchodilator (Order [id number])  Order-Level Documents:    Scan on 11/29/2024 1607 by Alisson Chisholm APRN: PULMONARY FUNCTION TEST, [facility], 11/29/2024         Author: -- Service: -- Author Type: --   Filed: Date of Service: Creation Time:   Status: (Other)     Pulmonary function test     Spirometry:  No obstructive lung defect noted.  FEV1 1.22 L / 60% predicted, FVC 1.29 L / 49% predicted.  Significant bronchodilator response was noted as both FEV1 and FVC improved by greater than 200 mL and 12% predicted.  Flow volume loop shows restrictive defect.     Lung volumes:  Moderate restrictive lung defect noted.  Total lung capacity is 3.82 L / 71% predicted.  ERV decreased consistent with obesity/poor body habitus.  No hyperinflation or air trapping.     Diffusion capacity:  Diffusion capacity severely reduced at 35% predicted.     Overall impression:  Moderate restrictive lung defect present.  Significant bronchodilator response was noted.  ERV was decreased which can be seen in obesity and poor body habitus.  Diffusion capacity severely reduced.     Electronically signed by Nic Vila MD, 12/02/24, 6:58 AM EST.            Exercise Oximetry     Patient Name:Manuelito Stratton   MRN: [id number]   Date: 11/29/24                                                                                                      ROOM AIR BASELINE   SpO2% 99   Heart Rate 75   Blood Pressure 144/69      EXERCISE ON ROOM AIR SpO2% EXERCISE ON O2 @  LPM SpO2%   1 MINUTE 99 1 MINUTE     2 MINUTES 96 2 MINUTES     3 MINUTES 96 3 MINUTES     4 MINUTES 98 4 MINUTES     5 MINUTES 97 5 MINUTES     6 MINUTES 98 6 MINUTES                                                                                                                   Distance Walked  720' Distance Walked   Dyspnea ([place] Scale)  Pre-0 Post-0 Dyspnea ([place] Scale)   Fatigue ([place] Scale)  1 Fatigue ([place] Scale)   SpO2% Post Exercise  98 SpO2% Post Exercise   HR Post Exercise  80 HR Post Exercise   Time to Recovery  NA Time to Recovery                Assessment         Patient Active Problem List   Diagnosis    Syncope    Abnormal CT of the chest    Multiple lung nodules on CT    Iron deficiency anemia    Malabsorption due to intolerance, not elsewhere classified        Plan     Diagnoses and all orders for this visit:    1. Metastatic non-small cell lung cancer (Primary)  Comments:  Follow-up with oncology and radiation oncology as scheduled    2. Restrictive lung disease  Comments:  patient declines albuterol inhaler at this time, Incentive spirometer encouraged    3. Multiple lung nodules on CT    4. Nicotine dependence, cigarettes, in remission  Comments:  quit 2004               Smoking status:  reports that she quit smoking about 20 years ago. Her smoking use included cigarettes. She started smoking about 40 years ago. She has a 10 pack-year smoking history. She has never used smokeless tobacco.    Vaccination status: Reviewed  Immunization History   Administered Date(s) Administered    COVID-19 (PFIZER) Purple Cap Monovalent 12/23/2020, 01/13/2021    Fluzone High-Dose 65+YRS 11/28/2017        Medications personally reviewed    Follow Up  Return in about 3 months (around 3/3/2025).    Patient was given instructions and counseling regarding her condition or for health maintenance  advice. Please see specific information pulled into the AVS if appropriate.     I spent 15 minutes caring for Manuelito Stratton on this date of service. This time includes time spent by me in the following activities:preparing for the visit, reviewing tests, obtaining and/or reviewing a separately obtained history, performing a medically appropriate examination and/or evaluation, counseling and educating the patient/family/caregiver, ordering medications, tests, or procedures, documenting information in the medical record, independently interpreting results and communicating that information with the patient/family/caregiver and answered questions family members, discuss medications.

## 2024-12-16 NOTE — PROGRESS NOTES
Chief Complaint/Care Team   Adenocarcinoma right lung    Mauro Cross MD Preston, Steven H, MD    History of Present Illness     Diagnosis: Right Upper Lobe Adenocarcinoma of Lung diagnosed 10/15/2024    Current Treatment: Work up in process    Previous Treatment: Bronchoscopy on 10/15/2024.    Manuelito Stratton is a 79 y.o. female who presents to [facility] HEMATOLOGY & ONCOLOGY for Right Upper Lobe Adenocarcinoma of Lung diagnosed 10/15/2024.    History of Present Illness  The patient is here to discuss treatment for lung cancer adenocarcinoma.    She reports no recent respiratory issues, although she did experience a mild cough during a recent bronchoscopy. She is not experiencing night sweats, fever, or lymph node enlargement. Her weight has remained stable.    She has a history of smoking, having quit 20 years ago after a 40-year habit of approximately 3 cigarettes per day. She has no other medical conditions, except for slightly elevated blood pressure, which is managed with Coreg. Pt reports history of Iron deficiency anemia. She also takes iron supplements and Celexa for anxiety.    She has not undergone any surgeries in the past and has declined a colonoscopy.    FAMILY HISTORY  She denies any family history of cancer.       Interval history: Patient here with daughter-in-law over the phone to discuss results of testing performed after last clinic appointment.  After long discussion, patient reports she not wish  to proceed with chemotherapy given history of cancer treatment and a relative of hers.  Patient is declining chemotherapy port placement, and declining referral to gastroenterology for EGD/colonoscopy.  She is worried regarding side effects of chemotherapy and would like to focus on quality of life rather than quantity of life.    Review of Systems   Constitutional:  Positive for appetite change and fatigue.   HENT: Negative.     Eyes: Negative.    Respiratory: Negative.    "  Cardiovascular: Negative.    Gastrointestinal: Negative.    Endocrine: Negative.    Genitourinary: Negative.    Musculoskeletal: Negative.    Skin: Negative.    Allergic/Immunologic: Negative.    Neurological: Negative.    Hematological: Negative.    Psychiatric/Behavioral: Negative.     All other systems reviewed and are negative.       Oncology/Hematology History    No history exists.       Objective     Vitals:    12/17/24 1045   BP: 118/65   Pulse: 71   Resp: 18   Temp: 97.9 °F (36.6 °C)   TempSrc: Temporal   SpO2: 100%   Weight: 58.8 kg (129 lb 9.6 oz)   Height: 165.1 cm (65\")   PainSc: 0-No pain       ECOG score: 0         PHQ-9 Total Score:         Physical Exam  Vitals reviewed. Exam conducted with a chaperone present.   Constitutional:       General: She is not in acute distress.     Appearance: Normal appearance.   HENT:      Head: Normocephalic and atraumatic.   Eyes:      Extraocular Movements: Extraocular movements intact.      Conjunctiva/sclera: Conjunctivae normal.   Cardiovascular:      Pulses: Normal pulses.      Heart sounds: Normal heart sounds.   Pulmonary:      Effort: Pulmonary effort is normal.      Breath sounds: Normal breath sounds. No rhonchi or rales.   Abdominal:      General: Bowel sounds are normal. There is no distension.      Palpations: Abdomen is soft. There is no mass.      Tenderness: There is no abdominal tenderness.   Musculoskeletal:      Cervical back: Normal range of motion and neck supple.   Skin:     General: Skin is warm and dry.      Findings: No bruising.   Neurological:      Mental Status: She is oriented to person, place, and time.         Physical Exam        Past Medical History     Past Medical History:   Diagnosis Date    Depression     Hypertension     Lung cancer      Current Outpatient Medications on File Prior to Visit   Medication Sig Dispense Refill    citalopram (CeleXA) 10 MG tablet Take 0.5 tablets by mouth Daily. (Patient taking differently: Take 1 " tablet by mouth Daily.)      ferrous gluconate (FERGON) 324 MG tablet Take 1 tablet by mouth Every 12 (Twelve) Hours.      [DISCONTINUED] Apixaban Starter Pack (Eliquis DVT/PE Starter Pack) tablet therapy pack Take two 5 mg tablets by mouth every 12 hours for 7 days. Followed by one 5 mg tablet every 12 hours. (Dispense starter pack if available) 74 tablet 0    carvedilol (COREG) 6.25 MG tablet Take 1 tablet by mouth 2 (Two) Times a Day With Meals for 30 days. 60 tablet 0     No current facility-administered medications on file prior to visit.      No Known Allergies  Past Surgical History:   Procedure Laterality Date    BRONCHOSCOPY N/A 10/15/2024    Procedure: BRONCHOSCOPY WITH ENDOBRONCHIAL ULTRASOUND W/ FNA, BAL, BIOPSIES;  Surgeon: Corey Naik MD;  Location: [facility] ENDOSCOPY;  Service: Pulmonary;  Laterality: N/A;  LUNG NODULES, MEDIASTINAL LYMPHADENOPATHY     Social History     Socioeconomic History    Marital status: Single   Tobacco Use    Smoking status: Former     Current packs/day: 0.00     Average packs/day: 0.5 packs/day for 20.0 years (10.0 ttl pk-yrs)     Types: Cigarettes     Start date:      Quit date:      Years since quittin.9    Smokeless tobacco: Never   Vaping Use    Vaping status: Never Used   Substance and Sexual Activity    Alcohol use: Not Currently    Drug use: Never    Sexual activity: Defer     Family History   Problem Relation Age of Onset    Cancer Other         son       Results     Result Review   The following data was reviewed by: Best Valentin MD on 10/23/2024:  Lab Results   Component Value Date    HGB 7.4 (C) 2024    HCT 24.5 (L) 2024    MCV 94.2 2024     2024    WBC 8.58 2024    NEUTROABS 6.79 2024    LYMPHSABS 0.95 2024    MONOSABS 0.66 2024    EOSABS 0.10 2024    BASOSABS 0.04 2024     Lab Results   Component Value Date    GLUCOSE 124 (H) 2024    BUN 32 (H) 2024    CREATININE 1.18  (H) 12/17/2024     12/17/2024    K 4.4 12/17/2024     12/17/2024    CO2 23.4 12/17/2024    CALCIUM 8.7 12/17/2024    PROTEINTOT 6.2 12/17/2024    ALBUMIN 3.0 (L) 12/17/2024    BILITOT 0.3 12/17/2024    ALKPHOS 264 (H) 12/17/2024    AST 25 12/17/2024    ALT 11 12/17/2024     Lab Results   Component Value Date    MG 2.1 09/30/2024    PHOS 3.3 12/12/2022    TSH 1.330 12/12/2022             3. Right hilum, FNA:               - Involved by adenocarcinoma        4. Lung, right upper lobe, BAL:               - Negative for malignant cells     The above positive (malignant) diagnosis was called to Tahira Schofield RN in Dr. CAMERON Naik's office at 08:58 EDT on 10/18/2024 by Fortunato OTERO       Electronically signed by Nic Bone MD on 10/18/2024 at 0902   Clinical Information    Lung nodules, mediastinal lymphadenopathy.   Comment    The morphologic and immunohistochemical features of the tumor cells are nonspecific.  The differential diagnosis includes, but is not limited to, adenocarcinoma of upper GI, lung, and pancreaticobiliary primaries.  Correlation with clinical and radiologic findings is recommended.     Please also see correlating biopsy case [id number].     Per policy, reflex testing for PD-L1, EGFR, ALK and ROS1 has been ordered, and the results will be separately reported.     Surgical Pathology Report                         Case: [id number]                                   Authorizing Provider:  Corey Naik MD         Collected:           10/15/2024 11:04 AM           Ordering Location:     [facility] Received:            10/16/2024 08:53 AM                                  SUITES                                                                       Pathologist:           Nic Bone MD                                                             Specimen:    Lung, Right Upper Lobe, RUL BIOPSIES                                                      Clinical Information     Abnormal CT of the chest  Multiple lung nodules on CT   Final Diagnosis   Lung, right upper lobe, biopsy:               - Adenocarcinoma, see comment     The above positive (malignant) diagnosis was called to Tahira Schofield RN in Dr. CAMERON Naik's office at 08:58 EDT on 10/18/2024 by Fortunato OTERO    Electronically signed by Nic Bone MD on 10/18/2024 at 0901   Comment    The morphologic and immunohistochemical features of the tumor cells are nonspecific.  The differential diagnosis includes, but is not limited to, adenocarcinoma of upper GI, lung, and pancreaticobiliary primaries.  Correlation with clinical and radiologic findings is recommended.     Please also see correlating biopsy case [id number].         No radiology results for the last day  CT Abdomen Pelvis With Contrast    Result Date: 12/3/2024  Impression: Impression: Small pulmonary emboli seen in the visualized left lower lobe pulmonary arteries. 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 Recommend follow-up chest CT to evaluate extent of pulmonary emboli. Discussed with referring physician at time of dictation. Numerous bilateral lung nodules compatible with neoplasm the largest measuring up to 2.3 cm Numerous liver lesions including a 4 cm lesion centrally and a larger 6.7 cm lesion in the inferior right lobe of the liver compatible with neoplasm. There is biliary dilation especially the left bile ducts compatible with biliary obstruction likely due to biliary obstruction from neoplasm. Mild to moderate abdominal and pelvic ascites. Cholelithiasis. Splenomegaly. Extensive upper abdominal metastatic adenopathy predominantly sharlene hepatis and retroperitoneum some which is necrotic compatible with neoplasm. Other findings as above. Electronically Signed: Ney Mejia MD  12/3/2024 11:08 AM EST  Workstation ID: [id number]     Assessment & Plan      Diagnoses and all orders for this visit:    1. Adenocarcinoma of right lung (Primary)  -     CT Chest With Contrast; Future  -     Cancel: Ambulatory Referral to Gastroenterology  -     CBC & Differential; Future  -     Comprehensive Metabolic Panel; Future  -     CEA; Future  -     AFP Tumor Marker; Future    2. Pulmonary embolism, unspecified chronicity, unspecified pulmonary embolism type, unspecified whether acute cor pulmonale present  -     apixaban (ELIQUIS) 5 MG tablet tablet; Take 1 tablet by mouth 2 (Two) Times a Day.  Dispense: 60 tablet; Refill: 1    3. Emotional distress  -     Ambulatory Referral to ONC Social Work    4. Financial insecurity  -     Ambulatory Referral to ONC Social Work    5. High risk medication use           Manuelito Stratton is a 79 y.o. female who presents to [facility] HEMATOLOGY & ONCOLOGY for Right Upper Lobe Adenocarcinoma of Lung diagnosed 10/15/2024     Assessment & Plan  1. Suspected stage IV non-small cell lung cancer.  The CT scan of her chest revealed multiple lung nodules bilaterally, with a biopsy confirming adenocarcinoma.   -The MRI of her brain was was negative for metastatic disease  -CT chest showed numerous bilateral lung nodules, ranging in size from 5 to 25 mm, concerning for metastatic disease, mediastinal and right hilar lymphadenopathy, partially visualized hypodense lesion in the caudal right hepatic lobe measuring 4 cm, 8 mm sclerotic focus in the T5 vertebral body.  -CT abdomen/pelvis will be scheduled to complete staging  -discussed pathology report from bronchoscopy in 10/2024 and imaging obtained thus far, shared my concern for metastatic disease  -discussed bronchoscopy showed Right upper lobe lung involvement with adenocarcinoma and Station 7 involved with adenocarcinoma, Right hilum positive for adenocarcinoma, left hilum negative for malignant cells  -would recommend biopsy of distant site if possible to confirm diagnosis of  metastatic disease  - Lab work, including CBC, CMP and CEA, will be conducted today.   -Obtained [facility] NGS testing to assess for other treatment options per NCCN guidelines, patient without any actionable rotations, MSI high was not detected, TMB of 10.5, low.  -pt pending evaluation by Dr. Reardon with Rad/onc scheduled for 10/24/2024  -concern for lung primary but pt hasn't undergone c-scope and CT abd/pelvis pending, thus ordered cancertype ID as well.   -Discussed results of cancer type ID which identified 90% chance of gastroesophageal source, recommended GI evaluation for EGD/colonoscopy, but patient declined this recommendation on 12/17/2024  - Also recommended chemotherapy port placement as patient has metastatic disease per imaging but patient declined this recommendation as she does not wish to proceed with chemotherapy per discussion on 12/17/2024  - Discussed recommendation for CT chest to further assess clot burden as well as complete staging, patient agreeable to undergo this test, thus ordered that today  -Patient is agreeable to undergo labs baseline CBC, CMP, CEA, patient found to have severe anemia, concerned this could be due to chronic blood loss from GI tract, thus ordered PRBC transfusion of 1 unit.  -Patient denies any blood loss or melena  -Since patient is declining chemotherapy will continue to monitor with imaging labs periodically.    2. Hypertension.  She is currently on Coreg for blood pressure management. Continue the current medication regimen.    3. Anxiety.  She is on Celexa for anxiety management. Continue the current medication regimen.     4. Iron deficiency.  She is taking iron pills for low iron levels. Continue the current medication regimen, will recheck iron studies with iron profile and ferritin today    Due to financial stress and personal stress, refer patient to oncology social worker to assess for other resources in the community to assist patient.    Follow-up  Return  in 6 weeks for follow-up with results of CT chest and recheck labs CBC, CMP, CEA, iron profile, ferritin    Please note that portions of this note were completed with a voice recognition program.      Electronically signed by Best Valentin MD, 12/17/24, 5:25 PM EST.      Follow Up     I spent 60 minutes caring for Manuelito on this date of service. This time includes time spent by me in the following activities:preparing for the visit, reviewing tests, obtaining and/or reviewing a separately obtained history, performing a medically appropriate examination and/or evaluation , counseling and educating the patient/family/caregiver, ordering medications, tests, or procedures, referring and communicating with other health care professionals , documenting information in the medical record, independently interpreting results and communicating that information with the patient/family/caregiver, and care coordination    Any chemotherapy or immunotherapy or other systemic therapy treatment plan involves a high risk of complications and/or mortality of patient management.    The patient was seen and examined. Work by the provider also included review and/or ordering of lab tests, review and/or ordering of radiology tests, review and/or ordering of medicine tests, discussion with other physicians or providers, independent review of data, obtaining old records, review/summation of old records, and/or other review.    I have reviewed the family history, social history, and past medical history for this patient. Previous information and data has been reviewed and updated as needed. I have reviewed and verified the chief complaint, history, and other documentation. The patient was interviewed and examined in the clinic and the chart reviewed. The previous observations, recommendations, and conclusions were reviewed including those of other providers.     The plan was discussed with the patient and/or family. The patient was given time  to ask questions and these questions were answered. At the conclusion of their visit they had no additional questions or concerns and all questions were answered to their satisfaction.    Patient was given instructions and counseling regarding her condition or for health maintenance advice. Please see specific information pulled into the AVS if appropriate.       Patient or patient representative verbalized consent for the use of Ambient Listening during the visit with  Best Valentin MD for chart documentation. 12/17/2024  16:45 EDT

## 2024-12-17 ENCOUNTER — LAB (OUTPATIENT)
Dept: ONCOLOGY | Facility: HOSPITAL | Age: 79
End: 2024-12-17
Payer: COMMERCIAL

## 2024-12-17 ENCOUNTER — OFFICE VISIT (OUTPATIENT)
Dept: ONCOLOGY | Facility: HOSPITAL | Age: 79
End: 2024-12-17
Payer: COMMERCIAL

## 2024-12-17 ENCOUNTER — TELEPHONE (OUTPATIENT)
Dept: ONCOLOGY | Facility: HOSPITAL | Age: 79
End: 2024-12-17
Payer: MEDICARE

## 2024-12-17 VITALS
WEIGHT: 129.6 LBS | SYSTOLIC BLOOD PRESSURE: 118 MMHG | RESPIRATION RATE: 18 BRPM | DIASTOLIC BLOOD PRESSURE: 65 MMHG | TEMPERATURE: 97.9 F | HEIGHT: 65 IN | HEART RATE: 71 BPM | OXYGEN SATURATION: 100 % | BODY MASS INDEX: 21.59 KG/M2

## 2024-12-17 DIAGNOSIS — D64.9 ANEMIA, UNSPECIFIED TYPE: Primary | ICD-10-CM

## 2024-12-17 DIAGNOSIS — I26.99 PULMONARY EMBOLISM, UNSPECIFIED CHRONICITY, UNSPECIFIED PULMONARY EMBOLISM TYPE, UNSPECIFIED WHETHER ACUTE COR PULMONALE PRESENT: ICD-10-CM

## 2024-12-17 DIAGNOSIS — C34.91 ADENOCARCINOMA OF RIGHT LUNG: Primary | ICD-10-CM

## 2024-12-17 DIAGNOSIS — Z59.86 FINANCIAL INSECURITY: ICD-10-CM

## 2024-12-17 DIAGNOSIS — C34.91 ADENOCARCINOMA OF RIGHT LUNG: ICD-10-CM

## 2024-12-17 DIAGNOSIS — Z79.899 HIGH RISK MEDICATION USE: ICD-10-CM

## 2024-12-17 DIAGNOSIS — R45.89 EMOTIONAL DISTRESS: ICD-10-CM

## 2024-12-17 LAB
ALBUMIN SERPL-MCNC: 3 G/DL (ref 3.5–5.2)
ALBUMIN/GLOB SERPL: 0.9 G/DL
ALP SERPL-CCNC: 264 U/L (ref 39–117)
ALPHA-FETOPROTEIN: 4.73 NG/ML (ref 0–8.3)
ALT SERPL W P-5'-P-CCNC: 11 U/L (ref 1–33)
ANION GAP SERPL CALCULATED.3IONS-SCNC: 7.6 MMOL/L (ref 5–15)
AST SERPL-CCNC: 25 U/L (ref 1–32)
BASOPHILS # BLD AUTO: 0.04 10*3/MM3 (ref 0–0.2)
BASOPHILS NFR BLD AUTO: 0.5 % (ref 0–1.5)
BILIRUB SERPL-MCNC: 0.3 MG/DL (ref 0–1.2)
BUN SERPL-MCNC: 32 MG/DL (ref 8–23)
BUN/CREAT SERPL: 27.1 (ref 7–25)
CALCIUM SPEC-SCNC: 8.7 MG/DL (ref 8.6–10.5)
CEA SERPL-MCNC: 140 NG/ML
CHLORIDE SERPL-SCNC: 107 MMOL/L (ref 98–107)
CO2 SERPL-SCNC: 23.4 MMOL/L (ref 22–29)
CREAT SERPL-MCNC: 1.18 MG/DL (ref 0.57–1)
DEPRECATED RDW RBC AUTO: 57.4 FL (ref 37–54)
EGFRCR SERPLBLD CKD-EPI 2021: 47.4 ML/MIN/1.73
EOSINOPHIL # BLD AUTO: 0.1 10*3/MM3 (ref 0–0.4)
EOSINOPHIL NFR BLD AUTO: 1.2 % (ref 0.3–6.2)
ERYTHROCYTE [DISTWIDTH] IN BLOOD BY AUTOMATED COUNT: 17.1 % (ref 12.3–15.4)
GLOBULIN UR ELPH-MCNC: 3.2 GM/DL
GLUCOSE SERPL-MCNC: 124 MG/DL (ref 65–99)
HCT VFR BLD AUTO: 24.5 % (ref 34–46.6)
HGB BLD-MCNC: 7.4 G/DL (ref 12–15.9)
IMM GRANULOCYTES # BLD AUTO: 0.04 10*3/MM3 (ref 0–0.05)
IMM GRANULOCYTES NFR BLD AUTO: 0.5 % (ref 0–0.5)
LYMPHOCYTES # BLD AUTO: 0.95 10*3/MM3 (ref 0.7–3.1)
LYMPHOCYTES NFR BLD AUTO: 11.1 % (ref 19.6–45.3)
MCH RBC QN AUTO: 28.5 PG (ref 26.6–33)
MCHC RBC AUTO-ENTMCNC: 30.2 G/DL (ref 31.5–35.7)
MCV RBC AUTO: 94.2 FL (ref 79–97)
MONOCYTES # BLD AUTO: 0.66 10*3/MM3 (ref 0.1–0.9)
MONOCYTES NFR BLD AUTO: 7.7 % (ref 5–12)
NEUTROPHILS NFR BLD AUTO: 6.79 10*3/MM3 (ref 1.7–7)
NEUTROPHILS NFR BLD AUTO: 79 % (ref 42.7–76)
NRBC BLD AUTO-RTO: 0 /100 WBC (ref 0–0.2)
PLATELET # BLD AUTO: 343 10*3/MM3 (ref 140–450)
PMV BLD AUTO: 9.8 FL (ref 6–12)
POTASSIUM SERPL-SCNC: 4.4 MMOL/L (ref 3.5–5.2)
PROT SERPL-MCNC: 6.2 G/DL (ref 6–8.5)
RBC # BLD AUTO: 2.6 10*6/MM3 (ref 3.77–5.28)
SODIUM SERPL-SCNC: 138 MMOL/L (ref 136–145)
WBC NRBC COR # BLD AUTO: 8.58 10*3/MM3 (ref 3.4–10.8)

## 2024-12-17 PROCEDURE — 82105 ALPHA-FETOPROTEIN SERUM: CPT

## 2024-12-17 PROCEDURE — 36415 COLL VENOUS BLD VENIPUNCTURE: CPT

## 2024-12-17 PROCEDURE — 80053 COMPREHEN METABOLIC PANEL: CPT

## 2024-12-17 PROCEDURE — 82378 CARCINOEMBRYONIC ANTIGEN: CPT

## 2024-12-17 PROCEDURE — 85025 COMPLETE CBC W/AUTO DIFF WBC: CPT

## 2024-12-17 PROCEDURE — G0463 HOSPITAL OUTPT CLINIC VISIT: HCPCS | Performed by: INTERNAL MEDICINE

## 2024-12-17 RX ORDER — SODIUM CHLORIDE 9 MG/ML
250 INJECTION, SOLUTION INTRAVENOUS AS NEEDED
OUTPATIENT
Start: 2024-12-17 | End: 2024-12-17

## 2024-12-17 SDOH — ECONOMIC STABILITY - INCOME SECURITY: FINANCIAL INSECURITY: Z59.86

## 2024-12-17 NOTE — TELEPHONE ENCOUNTER
Discussed with Lorraine, pt daughter in law, that patient hgb is 7.4 today and that Dr. Valentin would like her to receive one unit of blood; pt daughter in law discussed with patient and patient agreeable to get 1 unit PRBC. This was ordered and sent to scheduling.

## 2024-12-23 ENCOUNTER — TELEPHONE (OUTPATIENT)
Dept: ONCOLOGY | Facility: HOSPITAL | Age: 79
End: 2024-12-23
Payer: MEDICARE

## 2024-12-23 DIAGNOSIS — D64.9 ANEMIA, UNSPECIFIED TYPE: Primary | ICD-10-CM

## 2024-12-23 NOTE — TELEPHONE ENCOUNTER
LEFT MESSAGE FOR PATIENT IN REGARDS TO SCHEDULING IRON INFUSION, ASKED FOR PATIENT TO CALL OFFICE TO GET SCHEDULED.

## 2024-12-23 NOTE — TELEPHONE ENCOUNTER
LEFT MESSAGE FOR PATIENT IN REGARDS TO BLOOD TRANSFUSION. ASKED FOR PATIENT TO CALL OFFICE BACK FOR SCHEDULING. PATIENT NO SHOWED BLOOD TRANSFUSION ON 12/19/2024.

## 2024-12-26 ENCOUNTER — APPOINTMENT (OUTPATIENT)
Dept: GENERAL RADIOLOGY | Facility: HOSPITAL | Age: 79
End: 2024-12-26
Payer: COMMERCIAL

## 2024-12-26 ENCOUNTER — HOSPITAL ENCOUNTER (INPATIENT)
Facility: HOSPITAL | Age: 79
LOS: 4 days | Discharge: HOME | End: 2024-12-30
Attending: EMERGENCY MEDICINE | Admitting: STUDENT IN AN ORGANIZED HEALTH CARE EDUCATION/TRAINING PROGRAM
Payer: MEDICARE

## 2024-12-26 ENCOUNTER — APPOINTMENT (OUTPATIENT)
Dept: CT IMAGING | Facility: HOSPITAL | Age: 79
End: 2024-12-26
Payer: COMMERCIAL

## 2024-12-26 ENCOUNTER — HOSPITAL ENCOUNTER (EMERGENCY)
Facility: HOSPITAL | Age: 79
Discharge: LEFT BEFORE DISCHARGE | End: 2024-12-26
Attending: EMERGENCY MEDICINE

## 2024-12-26 DIAGNOSIS — D50.9 IRON DEFICIENCY ANEMIA: Primary | ICD-10-CM

## 2024-12-26 DIAGNOSIS — R26.2 DIFFICULTY IN WALKING: ICD-10-CM

## 2024-12-26 DIAGNOSIS — N39.0 ACUTE UTI: ICD-10-CM

## 2024-12-26 DIAGNOSIS — Z78.9 DECREASED ACTIVITIES OF DAILY LIVING (ADL): ICD-10-CM

## 2024-12-26 DIAGNOSIS — A41.9 SEPSIS, DUE TO UNSPECIFIED ORGANISM, UNSPECIFIED WHETHER ACUTE ORGAN DYSFUNCTION PRESENT: ICD-10-CM

## 2024-12-26 PROBLEM — R42 DIZZINESS: Status: ACTIVE | Noted: 2024-12-26

## 2024-12-26 LAB
ALBUMIN SERPL-MCNC: 2.8 G/DL (ref 3.5–5.2)
ALBUMIN/GLOB SERPL: 0.8 G/DL
ALP SERPL-CCNC: 322 U/L (ref 39–117)
ALT SERPL W P-5'-P-CCNC: 12 U/L (ref 1–33)
ANION GAP SERPL CALCULATED.3IONS-SCNC: 11 MMOL/L (ref 5–15)
AST SERPL-CCNC: 25 U/L (ref 1–32)
BACTERIA UR QL AUTO: ABNORMAL /HPF
BASOPHILS # BLD AUTO: 0.05 10*3/MM3 (ref 0–0.2)
BASOPHILS NFR BLD AUTO: 0.3 % (ref 0–1.5)
BILIRUB SERPL-MCNC: 0.4 MG/DL (ref 0–1.2)
BILIRUB UR QL STRIP: NEGATIVE
BUN SERPL-MCNC: 32 MG/DL (ref 8–23)
BUN/CREAT SERPL: 29.1 (ref 7–25)
CALCIUM SPEC-SCNC: 8.3 MG/DL (ref 8.6–10.5)
CHLORIDE SERPL-SCNC: 108 MMOL/L (ref 98–107)
CLARITY UR: ABNORMAL
CO2 SERPL-SCNC: 19 MMOL/L (ref 22–29)
COLOR UR: ABNORMAL
CREAT SERPL-MCNC: 1.1 MG/DL (ref 0.57–1)
DEPRECATED RDW RBC AUTO: 69 FL (ref 37–54)
EGFRCR SERPLBLD CKD-EPI 2021: 51.5 ML/MIN/1.73
EOSINOPHIL # BLD AUTO: 0.07 10*3/MM3 (ref 0–0.4)
EOSINOPHIL NFR BLD AUTO: 0.5 % (ref 0.3–6.2)
ERYTHROCYTE [DISTWIDTH] IN BLOOD BY AUTOMATED COUNT: 18.3 % (ref 12.3–15.4)
GEN 5 1HR TROPONIN T REFLEX: 33 NG/L
GLOBULIN UR ELPH-MCNC: 3.5 GM/DL
GLUCOSE SERPL-MCNC: 112 MG/DL (ref 65–99)
GLUCOSE UR STRIP-MCNC: NEGATIVE MG/DL
HCT VFR BLD AUTO: 28.5 % (ref 34–46.6)
HGB BLD-MCNC: 8.2 G/DL (ref 12–15.9)
HGB UR QL STRIP.AUTO: NEGATIVE
HOLD SPECIMEN: NORMAL
HOLD SPECIMEN: NORMAL
HYALINE CASTS UR QL AUTO: ABNORMAL /LPF
IMM GRANULOCYTES # BLD AUTO: 0.07 10*3/MM3 (ref 0–0.05)
IMM GRANULOCYTES NFR BLD AUTO: 0.5 % (ref 0–0.5)
KETONES UR QL STRIP: ABNORMAL
LEUKOCYTE ESTERASE UR QL STRIP.AUTO: ABNORMAL
LYMPHOCYTES # BLD AUTO: 1.09 10*3/MM3 (ref 0.7–3.1)
LYMPHOCYTES NFR BLD AUTO: 7.3 % (ref 19.6–45.3)
MAGNESIUM SERPL-MCNC: 2.2 MG/DL (ref 1.6–2.4)
MCH RBC QN AUTO: 29.8 PG (ref 26.6–33)
MCHC RBC AUTO-ENTMCNC: 28.8 G/DL (ref 31.5–35.7)
MCV RBC AUTO: 103.6 FL (ref 79–97)
MONOCYTES # BLD AUTO: 1.16 10*3/MM3 (ref 0.1–0.9)
MONOCYTES NFR BLD AUTO: 7.8 % (ref 5–12)
NEUTROPHILS NFR BLD AUTO: 12.43 10*3/MM3 (ref 1.7–7)
NEUTROPHILS NFR BLD AUTO: 83.6 % (ref 42.7–76)
NITRITE UR QL STRIP: NEGATIVE
NRBC BLD AUTO-RTO: 0 /100 WBC (ref 0–0.2)
PH UR STRIP.AUTO: <=5 [PH] (ref 5–8)
PLATELET # BLD AUTO: 353 10*3/MM3 (ref 140–450)
PMV BLD AUTO: 9.1 FL (ref 6–12)
POTASSIUM SERPL-SCNC: 4.4 MMOL/L (ref 3.5–5.2)
PROT SERPL-MCNC: 6.3 G/DL (ref 6–8.5)
PROT UR QL STRIP: ABNORMAL
QT INTERVAL: 381 MS
QTC INTERVAL: 454 MS
RBC # BLD AUTO: 2.75 10*6/MM3 (ref 3.77–5.28)
RBC # UR STRIP: ABNORMAL /HPF
REF LAB TEST METHOD: ABNORMAL
SODIUM SERPL-SCNC: 138 MMOL/L (ref 136–145)
SP GR UR STRIP: 1.02 (ref 1–1.03)
SQUAMOUS #/AREA URNS HPF: ABNORMAL /HPF
TROPONIN T % DELTA: 0 %
TROPONIN T NUMERIC DELTA: 0 NG/L
TROPONIN T SERPL HS-MCNC: 33 NG/L
UROBILINOGEN UR QL STRIP: ABNORMAL
WBC # UR STRIP: ABNORMAL /HPF
WBC NRBC COR # BLD AUTO: 14.87 10*3/MM3 (ref 3.4–10.8)
WHOLE BLOOD HOLD COAG: NORMAL
WHOLE BLOOD HOLD SPECIMEN: NORMAL

## 2024-12-26 PROCEDURE — 85025 COMPLETE CBC W/AUTO DIFF WBC: CPT

## 2024-12-26 PROCEDURE — 36415 COLL VENOUS BLD VENIPUNCTURE: CPT

## 2024-12-26 PROCEDURE — 71045 X-RAY EXAM CHEST 1 VIEW: CPT

## 2024-12-26 PROCEDURE — 99223 1ST HOSP IP/OBS HIGH 75: CPT | Performed by: STUDENT IN AN ORGANIZED HEALTH CARE EDUCATION/TRAINING PROGRAM

## 2024-12-26 PROCEDURE — 25010000002 CEFTRIAXONE PER 250 MG: Performed by: EMERGENCY MEDICINE

## 2024-12-26 PROCEDURE — 93005 ELECTROCARDIOGRAM TRACING: CPT

## 2024-12-26 PROCEDURE — 93005 ELECTROCARDIOGRAM TRACING: CPT | Performed by: EMERGENCY MEDICINE

## 2024-12-26 PROCEDURE — 82607 VITAMIN B-12: CPT | Performed by: STUDENT IN AN ORGANIZED HEALTH CARE EDUCATION/TRAINING PROGRAM

## 2024-12-26 PROCEDURE — 84145 PROCALCITONIN (PCT): CPT | Performed by: STUDENT IN AN ORGANIZED HEALTH CARE EDUCATION/TRAINING PROGRAM

## 2024-12-26 PROCEDURE — 87040 BLOOD CULTURE FOR BACTERIA: CPT | Performed by: EMERGENCY MEDICINE

## 2024-12-26 PROCEDURE — 80053 COMPREHEN METABOLIC PANEL: CPT

## 2024-12-26 PROCEDURE — 81001 URINALYSIS AUTO W/SCOPE: CPT | Performed by: EMERGENCY MEDICINE

## 2024-12-26 PROCEDURE — 83735 ASSAY OF MAGNESIUM: CPT

## 2024-12-26 PROCEDURE — 70450 CT HEAD/BRAIN W/O DYE: CPT

## 2024-12-26 PROCEDURE — 84484 ASSAY OF TROPONIN QUANT: CPT

## 2024-12-26 PROCEDURE — 82746 ASSAY OF FOLIC ACID SERUM: CPT | Performed by: STUDENT IN AN ORGANIZED HEALTH CARE EDUCATION/TRAINING PROGRAM

## 2024-12-26 PROCEDURE — 25810000003 SODIUM CHLORIDE 0.9 % SOLUTION: Performed by: EMERGENCY MEDICINE

## 2024-12-26 PROCEDURE — 87086 URINE CULTURE/COLONY COUNT: CPT | Performed by: EMERGENCY MEDICINE

## 2024-12-26 PROCEDURE — 99291 CRITICAL CARE FIRST HOUR: CPT

## 2024-12-26 PROCEDURE — 99211 OFF/OP EST MAY X REQ PHY/QHP: CPT | Performed by: EMERGENCY MEDICINE

## 2024-12-26 RX ORDER — SODIUM CHLORIDE 0.9 % (FLUSH) 0.9 %
10 SYRINGE (ML) INJECTION AS NEEDED
Status: DISCONTINUED | OUTPATIENT
Start: 2024-12-26 | End: 2024-12-27

## 2024-12-26 RX ADMIN — SODIUM CHLORIDE 1000 MG: 9 INJECTION INTRAMUSCULAR; INTRAVENOUS; SUBCUTANEOUS at 23:29

## 2024-12-26 RX ADMIN — SODIUM CHLORIDE 1000 ML: 9 INJECTION, SOLUTION INTRAVENOUS at 21:44

## 2024-12-26 NOTE — ED PROVIDER NOTES
Time: 3:47 PM EST  Date of encounter:  12/26/2024  Independent Historian/Clinical History and Information was obtained by:   Patient    History is limited by: N/A    Chief Complaint   Patient presents with    Dizziness         History of Present Illness:  Patient is a 79 y.o. year old female who presents to the emergency department for evaluation of dizziness.  Patient came in via EMS and states that she has been having dizziness since this morning.  She was recently diagnosed with abdominal cancer, unsure specifically what it was.  She has had decreased appetite.  States that she has been able to tolerate fluids. (Triage Provider: Brandyn Beard PA-C).    Patient states she is not undergoing any treatment for her cancer at this time.  States she has had no appetite today and believes that she is dehydrated.      Patient Care Team  Primary Care Provider: Mauro Cross MD    Past Medical History:     No Known Allergies  Past Medical History:   Diagnosis Date    Depression     Hypertension     Lung cancer      Past Surgical History:   Procedure Laterality Date    BRONCHOSCOPY N/A 10/15/2024    Procedure: BRONCHOSCOPY WITH ENDOBRONCHIAL ULTRASOUND W/ FNA, BAL, BIOPSIES;  Surgeon: Corey Naik MD;  Location: [facility] ENDOSCOPY;  Service: Pulmonary;  Laterality: N/A;  LUNG NODULES, MEDIASTINAL LYMPHADENOPATHY     Family History   Problem Relation Age of Onset    Cancer Other         son       Home Medications:  Prior to Admission medications    Medication Sig Start Date End Date Taking? Authorizing Provider   apixaban (ELIQUIS) 5 MG tablet tablet Take 1 tablet by mouth 2 (Two) Times a Day. 12/17/24   Best Valentin MD   carvedilol (COREG) 6.25 MG tablet Take 1 tablet by mouth 2 (Two) Times a Day With Meals for 30 days. 12/12/22 12/3/24  Ze Werner MD   citalopram (CeleXA) 10 MG tablet Take 0.5 tablets by mouth Daily.  Patient taking differently: Take 1 tablet by mouth Daily. 12/12/22   Ze Werner MD  "  ferrous gluconate (FERGON) 324 MG tablet Take 1 tablet by mouth Every 12 (Twelve) Hours. 24   Provider, Christa, MD        Social History:   Social History     Tobacco Use    Smoking status: Former     Current packs/day: 0.00     Average packs/day: 0.5 packs/day for 20.0 years (10.0 ttl pk-yrs)     Types: Cigarettes     Start date:      Quit date:      Years since quittin.0    Smokeless tobacco: Never   Vaping Use    Vaping status: Never Used   Substance Use Topics    Alcohol use: Not Currently    Drug use: Never         Review of Systems:  Review of Systems   Constitutional:  Positive for activity change, appetite change and fatigue. Negative for chills and fever.   HENT:  Negative for congestion, ear pain and sore throat.    Eyes:  Negative for pain.   Respiratory:  Negative for cough, chest tightness and shortness of breath.    Cardiovascular:  Negative for chest pain.   Gastrointestinal:  Negative for abdominal pain, diarrhea, nausea and vomiting.   Genitourinary:  Negative for flank pain and hematuria.   Musculoskeletal:  Negative for joint swelling.   Skin:  Negative for pallor.   Neurological:  Positive for dizziness, weakness and numbness. Negative for seizures and headaches.   All other systems reviewed and are negative.       Physical Exam:  /83 (BP Location: Right arm, Patient Position: Standing)   Pulse 93   Temp 98.2 °F (36.8 °C) (Oral)   Resp 18   Ht 167.6 cm (66\")   Wt 56.1 kg (123 lb 10.9 oz)   SpO2 100%   BMI 19.96 kg/m²         Physical Exam  Vitals and nursing note reviewed.   Constitutional:       General: She is not in acute distress.     Appearance: Normal appearance. She is not toxic-appearing.   HENT:      Head: Normocephalic and atraumatic.      Jaw: There is normal jaw occlusion.      Mouth/Throat:      Mouth: Mucous membranes are dry.      Comments: Mildly dry lips and mucous membranes.  Eyes:      General: Lids are normal.      Extraocular Movements: " Extraocular movements intact.      Conjunctiva/sclera: Conjunctivae normal.      Pupils: Pupils are equal, round, and reactive to light.   Cardiovascular:      Rate and Rhythm: Normal rate and regular rhythm.      Pulses: Normal pulses.      Heart sounds: Normal heart sounds.   Pulmonary:      Effort: Pulmonary effort is normal. No respiratory distress.      Breath sounds: Normal breath sounds. No wheezing or rhonchi.   Abdominal:      General: Abdomen is flat.      Palpations: Abdomen is soft.      Tenderness: There is no abdominal tenderness. There is no guarding or rebound.   Musculoskeletal:         General: Normal range of motion.      Cervical back: Normal range of motion and neck supple.      Right lower leg: No edema.      Left lower leg: No edema.   Skin:     General: Skin is warm and dry.   Neurological:      Mental Status: She is alert and oriented to person, place, and time. Mental status is at baseline.      Comments: NIHSS = 0   Psychiatric:         Mood and Affect: Mood normal.                Medical Decision Making:      Comorbidities that affect care:    Hypertension, depression, lung cancer, former smoker      External Notes reviewed:  Outpatient oncology visit 12/17/2024 with Dr. Graham for adenocarcinoma of right lung        The following orders were placed and all results were independently analyzed by me:  Orders Placed This Encounter   Procedures    Urine Culture - Urine,    Blood Culture - Blood,    Blood Culture - Blood,    XR Chest 1 View    CT Head Without Contrast    [place] Draw    Comprehensive Metabolic Panel    High Sensitivity Troponin T    Magnesium    CBC Auto Differential    High Sensitivity Troponin T 1Hr    Urinalysis With Culture If Indicated - Urine, Clean Catch    Urinalysis, Microscopic Only - Urine, Clean Catch    Lactic Acid, Plasma    Procalcitonin    CBC (No Diff)    Comprehensive Metabolic Panel    Magnesium    Phosphorus    Folate    Vitamin B12    Iron Profile     Ferritin    Basic Metabolic Panel    CBC (No Diff)    Magnesium    NPO Diet NPO Type: Strict NPO    Undress & Gown    Continuous Pulse Oximetry    Vital Signs    Vital Signs    Notify Provider (With Default Parameters)    Activity - Ad Jaja    Intake & Output    Weigh Patient    Oral Care    Saline Lock & Maintain IV Access    Discontinue Cardiac Monitoring    Orthostatic Blood Pressure    Place Sequential Compression Device    Maintain Sequential Compression Device    Verify Informed Consent for Blood Product Administration    Obtain Informed Consent    Code Status and Medical Interventions: CPR (Attempt to Resuscitate); Full Support    Hospitalist (on-call MD unless specified)    Inpatient Gastroenterology Consult    Inpatient Palliative Care Team Consult    OT Consult: Eval & Treat ADL Performance Below Baseline    OT Plan of Care Cert / Re-Cert    PT Consult: Eval & Treat Discharge Placement Assessment    Oxygen Therapy- Nasal Cannula; Titrate 1-6 LPM Per SpO2; 90% or Greater    POC Glucose Once    ECG 12 Lead ED Triage Standing Order; Weak / Dizzy / AMS    Type & Screen    Prepare RBC, 2 Units    ABO RH Specimen Verification    Insert Peripheral IV    Insert Peripheral IV    Inpatient Admission    Fall Precautions    CBC & Differential    Green Top (Gel)    Lavender Top    Gold Top - SST    Light Blue Top       Medications Given in the Emergency Department:  Medications   lactated ringers infusion (75 mL/hr Intravenous New Bag 12/27/24 0132)   apixaban (ELIQUIS) tablet 5 mg ( Oral Dose Auto Held 1/12/25 2100)   carvedilol (COREG) tablet 6.25 mg (6.25 mg Oral Given 12/27/24 1720)   cefTRIAXone (ROCEPHIN) in NS 1 gram/10ml IV PUSH syringe (1,000 mg Intravenous Given 12/27/24 2015)   sodium chloride 0.9 % flush 10 mL (10 mL Intravenous Given 12/27/24 2016)   sodium chloride 0.9 % flush 10 mL (has no administration in time range)   sodium chloride 0.9 % infusion 40 mL (has no administration in time range)    citalopram (CeleXA) tablet 40 mg (40 mg Oral Given 12/27/24 0934)   pantoprazole (PROTONIX) injection 40 mg (40 mg Intravenous Given 12/27/24 1713)   sodium chloride 0.9 % bolus 1,000 mL (0 mL Intravenous Stopped 12/26/24 2320)   cefTRIAXone (ROCEPHIN) in NS 1 gram/10ml IV PUSH syringe (1,000 mg Intravenous Given 12/26/24 2329)   ferric gluconate (FERRLECIT)125 MG in sodium chloride 0.9 % 100 mL IVPB (125 mg Intravenous New Bag 12/27/24 0934)        ED Course:    The patient was initially evaluated in the triage area where orders were placed. The patient was later dispositioned by Ney Griffith MD.      The patient was advised to stay for completion of workup which includes but is not limited to communication of labs and radiological results, reassessment and plan. The patient was advised that leaving prior to disposition by a provider could result in critical findings that are not communicated to the patient.     ED Course as of 12/28/24 0622   Thu Dec 26, 2024   1547 PROVIDER IN TRIAGE  Patient was evaluated by Brandyn zhu PA-C. Orders were placed and awaiting final results and disposition.   [MV]   2320 Discussed patient's workup and presentation with the hospitalist who agrees to admission.  The patient's airway is intact, vital signs, and respiratory status are safe for admission at this time.   [JS]      ED Course User Index  [JS] Ney Griffith MD  [MV] Brandyn Beard PA       Labs:    Lab Results (last 24 hours)       Procedure Component Value Units Date/Time    Basic Metabolic Panel [[id number]] Collected: 12/28/24 0505    Specimen: Blood from Hand, Right Updated: 12/28/24 0603    CBC (No Diff) [[id number]]  (Abnormal) Collected: 12/28/24 0505    Specimen: Blood from Hand, Right Updated: 12/28/24 0613     WBC 10.29 10*3/mm3      RBC 3.54 10*6/mm3      Hemoglobin 10.3 g/dL      Hematocrit 32.1 %      Comment: Patient received blood products        MCV 90.7 fL      MCH 29.1 pg      MCHC 32.1 g/dL      RDW  16.9 %      RDW-SD 56.1 fl      MPV 9.2 fL      Platelets 254 10*3/mm3     Magnesium [[id number]] Collected: 12/28/24 0505    Specimen: Blood from Hand, Right Updated: 12/28/24 0603             Imaging:    No Radiology Exams Resulted Within Past 24 Hours      Differential Diagnosis and Discussion:      Dizziness: Based on the patient's history, signs, and symptoms, the diffential diagnosis includes but is not limited to meningitis, stroke, sepsis, subarachnoid hemorrhage, intracranial bleeding, encephalitis, vertigo, electrolyte imbalance, and metabolic disorders.    PROCEDURES:    Labs were collected in the emergency department and all labs were reviewed and interpreted by me.  X-ray were performed in the emergency department and all X-ray impressions were independently interpreted by me.  An EKG was performed and the EKG was interpreted by me.  CT scan was performed in the emergency department and the CT scan radiology impression was interpreted by me.    ECG 12 Lead ED Triage Standing Order; Weak / Dizzy / AMS   Preliminary Result   HEART RATE=85  bpm   RR Bwydkvsc=548  ms   UT Mvrjcqiz=213  ms   P Horizontal Axis=37  deg   P Front Axis=25  deg   QRSD Interval=83  ms   QT Cpsafkia=716  ms   RFkH=512  ms   QRS Axis=-31  deg   T Wave Axis=-8  deg   - ABNORMAL ECG -   Sinus rhythm   Atrial premature complexes   Left axis deviation   Probable anterior infarct, age indeterminate   Date and Time of Study:2024-12-26 15:51:43           Procedures    MDM               Sepsis criteria was met in the emergency department and the Sepsis protocol (including antibiotic administration) was initiated.      SIRS criteria considered:   1.  Temperature > 100.4 or <96.8    2.  Heart Rate > 90    3.  Respiratory Rate > 22    4.  WBC > 12K or <4K.             Severe Sepsis:     Respiratory: Mechanical Ventilation or Bipap  Hypotension: SBP > 90 or MAP < 65  Renal: Creatinine > 2  Metabolic: Lactic Acid > 2  Hematologic: Platelets <  100K or INR > 1.5  Hepatic: BILI  >  2  CNS: Sudden AMS     Septic Shock:     Severe Sepsis + Persistent hypotension or Lactic Acid > 4     Normal saline bolus, Antibiotics, and final disposition was based on these definitions.        Sepsis was recognized at 2306    Antibiotics were ordered.     30 mL/kg bolus was NOT indicated.       Patient did not receive the recommended 30 mL/kg fluid bolus for sepsis because it would be harmful or detrimental to the patient.    The patient has Concern for Fluid Overload.   The patient was ordered 1L of fluids.    The patient presents with 2 out of the 4 SIRS criteria and a suspected source for sepsis.  Patient was evaluated and placed on a cardiac monitor for fear of worsening tachycardia and life-threatening hypotension.  Patient was monitored for shock and signs of end-organ damage.  Mental status was repeatedly checked throughout the ED stay.  Medications were ordered by me which includes ceftriaxone.  The case was discussed at length with admitting physician.    Total Critical Care time of 35 minutes. Total critical care time documented does not include time spent on separately billed procedures for services of nurses or physician assistants. I personally saw and examined the patient. I have reviewed all diagnostic interpretations and treatment plans as written. I was present for the key portions of any procedures performed and the inclusive time noted in any critical care statement. Critical care time includes patient management by me, time spent at the patients bedside,  time to review lab and imaging results, discussing patient care, documentation in the medical record, and time spent with family or caregiver.    Patient Care Considerations:          Consultants/Shared Management Plan:    Hospitalist: I have discussed the case with the hospitalist who agrees to accept the patient for admission.    Social Determinants of Health:    Patient is independent, reliable, and  has access to care.       Disposition and Care Coordination:    Admit:   Through independent evaluation of the patient's history, physical, and imperical data, the patient meets criteria for inpatient admission to the hospital.        Final diagnoses:   Acute UTI   Sepsis, due to unspecified organism, unspecified whether acute organ dysfunction present        ED Disposition       ED Disposition   Decision to Admit    Condition   --    Comment   Level of Care: Med/Surg [1]   Diagnosis: Dizziness [233314]   Certification: I Certify That Inpatient Hospital Services Are Medically Necessary For Greater Than 2 Midnights                 This medical record created using voice recognition software.             Ney Griffith MD  12/28/24 0622

## 2024-12-27 ENCOUNTER — ANESTHESIA (OUTPATIENT)
Dept: GASTROENTEROLOGY | Facility: HOSPITAL | Age: 79
End: 2024-12-27
Payer: MEDICARE

## 2024-12-27 ENCOUNTER — ANESTHESIA EVENT (OUTPATIENT)
Dept: GASTROENTEROLOGY | Facility: HOSPITAL | Age: 79
End: 2024-12-27
Payer: MEDICARE

## 2024-12-27 LAB
ABO GROUP BLD: NORMAL
ABO GROUP BLD: NORMAL
ALBUMIN SERPL-MCNC: 2.4 G/DL (ref 3.5–5.2)
ALBUMIN/GLOB SERPL: 0.8 G/DL
ALP SERPL-CCNC: 299 U/L (ref 39–117)
ALT SERPL W P-5'-P-CCNC: 11 U/L (ref 1–33)
ANION GAP SERPL CALCULATED.3IONS-SCNC: 9.7 MMOL/L (ref 5–15)
AST SERPL-CCNC: 23 U/L (ref 1–32)
BILIRUB SERPL-MCNC: 0.2 MG/DL (ref 0–1.2)
BLD GP AB SCN SERPL QL: NEGATIVE
BUN SERPL-MCNC: 30 MG/DL (ref 8–23)
BUN/CREAT SERPL: 28.6 (ref 7–25)
CALCIUM SPEC-SCNC: 8.1 MG/DL (ref 8.6–10.5)
CHLORIDE SERPL-SCNC: 107 MMOL/L (ref 98–107)
CO2 SERPL-SCNC: 20.3 MMOL/L (ref 22–29)
CREAT SERPL-MCNC: 1.05 MG/DL (ref 0.57–1)
D-LACTATE SERPL-SCNC: 1.2 MMOL/L (ref 0.5–2)
DEPRECATED RDW RBC AUTO: 64.2 FL (ref 37–54)
EGFRCR SERPLBLD CKD-EPI 2021: 54.5 ML/MIN/1.73
ERYTHROCYTE [DISTWIDTH] IN BLOOD BY AUTOMATED COUNT: 18.2 % (ref 12.3–15.4)
FERRITIN SERPL-MCNC: 381.4 NG/ML (ref 13–150)
FOLATE SERPL-MCNC: 4.87 NG/ML (ref 4.78–24.2)
GLOBULIN UR ELPH-MCNC: 3.1 GM/DL
GLUCOSE SERPL-MCNC: 100 MG/DL (ref 65–99)
HCT VFR BLD AUTO: 23.4 % (ref 34–46.6)
HGB BLD-MCNC: 7 G/DL (ref 12–15.9)
IRON 24H UR-MRATE: 26 MCG/DL (ref 37–145)
IRON SATN MFR SERPL: 11 % (ref 20–50)
MAGNESIUM SERPL-MCNC: 2.1 MG/DL (ref 1.6–2.4)
MCH RBC QN AUTO: 29.2 PG (ref 26.6–33)
MCHC RBC AUTO-ENTMCNC: 29.9 G/DL (ref 31.5–35.7)
MCV RBC AUTO: 97.5 FL (ref 79–97)
PHOSPHATE SERPL-MCNC: 3.4 MG/DL (ref 2.5–4.5)
PLATELET # BLD AUTO: 319 10*3/MM3 (ref 140–450)
PMV BLD AUTO: 9.4 FL (ref 6–12)
POTASSIUM SERPL-SCNC: 4.2 MMOL/L (ref 3.5–5.2)
PROCALCITONIN SERPL-MCNC: 0.16 NG/ML (ref 0–0.25)
PROT SERPL-MCNC: 5.5 G/DL (ref 6–8.5)
RBC # BLD AUTO: 2.4 10*6/MM3 (ref 3.77–5.28)
RH BLD: POSITIVE
RH BLD: POSITIVE
SODIUM SERPL-SCNC: 137 MMOL/L (ref 136–145)
T&S EXPIRATION DATE: NORMAL
TIBC SERPL-MCNC: 235 MCG/DL (ref 298–536)
TRANSFERRIN SERPL-MCNC: 158 MG/DL (ref 200–360)
VIT B12 BLD-MCNC: 743 PG/ML (ref 211–946)
WBC NRBC COR # BLD AUTO: 10.82 10*3/MM3 (ref 3.4–10.8)

## 2024-12-27 PROCEDURE — 86900 BLOOD TYPING SEROLOGIC ABO: CPT | Performed by: INTERNAL MEDICINE

## 2024-12-27 PROCEDURE — 86901 BLOOD TYPING SEROLOGIC RH(D): CPT | Performed by: INTERNAL MEDICINE

## 2024-12-27 PROCEDURE — 84466 ASSAY OF TRANSFERRIN: CPT | Performed by: INTERNAL MEDICINE

## 2024-12-27 PROCEDURE — 86850 RBC ANTIBODY SCREEN: CPT | Performed by: INTERNAL MEDICINE

## 2024-12-27 PROCEDURE — 97165 OT EVAL LOW COMPLEX 30 MIN: CPT

## 2024-12-27 PROCEDURE — 97161 PT EVAL LOW COMPLEX 20 MIN: CPT

## 2024-12-27 PROCEDURE — 86901 BLOOD TYPING SEROLOGIC RH(D): CPT

## 2024-12-27 PROCEDURE — 86923 COMPATIBILITY TEST ELECTRIC: CPT

## 2024-12-27 PROCEDURE — 25810000003 LACTATED RINGERS PER 1000 ML: Performed by: STUDENT IN AN ORGANIZED HEALTH CARE EDUCATION/TRAINING PROGRAM

## 2024-12-27 PROCEDURE — 85027 COMPLETE CBC AUTOMATED: CPT | Performed by: STUDENT IN AN ORGANIZED HEALTH CARE EDUCATION/TRAINING PROGRAM

## 2024-12-27 PROCEDURE — 86900 BLOOD TYPING SEROLOGIC ABO: CPT

## 2024-12-27 PROCEDURE — 83605 ASSAY OF LACTIC ACID: CPT | Performed by: STUDENT IN AN ORGANIZED HEALTH CARE EDUCATION/TRAINING PROGRAM

## 2024-12-27 PROCEDURE — 25010000002 NA FERRIC GLUC CPLX PER 12.5 MG: Performed by: INTERNAL MEDICINE

## 2024-12-27 PROCEDURE — 25010000002 CEFTRIAXONE PER 250 MG: Performed by: STUDENT IN AN ORGANIZED HEALTH CARE EDUCATION/TRAINING PROGRAM

## 2024-12-27 PROCEDURE — 99233 SBSQ HOSP IP/OBS HIGH 50: CPT | Performed by: INTERNAL MEDICINE

## 2024-12-27 PROCEDURE — 36430 TRANSFUSION BLD/BLD COMPNT: CPT

## 2024-12-27 PROCEDURE — 83735 ASSAY OF MAGNESIUM: CPT | Performed by: STUDENT IN AN ORGANIZED HEALTH CARE EDUCATION/TRAINING PROGRAM

## 2024-12-27 PROCEDURE — P9016 RBC LEUKOCYTES REDUCED: HCPCS

## 2024-12-27 PROCEDURE — 84100 ASSAY OF PHOSPHORUS: CPT | Performed by: STUDENT IN AN ORGANIZED HEALTH CARE EDUCATION/TRAINING PROGRAM

## 2024-12-27 PROCEDURE — 83540 ASSAY OF IRON: CPT | Performed by: INTERNAL MEDICINE

## 2024-12-27 PROCEDURE — 99222 1ST HOSP IP/OBS MODERATE 55: CPT | Performed by: INTERNAL MEDICINE

## 2024-12-27 PROCEDURE — 82728 ASSAY OF FERRITIN: CPT | Performed by: INTERNAL MEDICINE

## 2024-12-27 PROCEDURE — 80053 COMPREHEN METABOLIC PANEL: CPT | Performed by: STUDENT IN AN ORGANIZED HEALTH CARE EDUCATION/TRAINING PROGRAM

## 2024-12-27 RX ORDER — SODIUM CHLORIDE 9 MG/ML
40 INJECTION, SOLUTION INTRAVENOUS AS NEEDED
Status: DISCONTINUED | OUTPATIENT
Start: 2024-12-27 | End: 2024-12-30 | Stop reason: HOSPADM

## 2024-12-27 RX ORDER — CITALOPRAM HYDROBROMIDE 20 MG/1
40 TABLET ORAL DAILY
Status: DISCONTINUED | OUTPATIENT
Start: 2024-12-27 | End: 2024-12-30 | Stop reason: HOSPADM

## 2024-12-27 RX ORDER — CARVEDILOL 12.5 MG/1
1 TABLET ORAL EVERY 12 HOURS SCHEDULED
COMMUNITY
Start: 2024-12-04

## 2024-12-27 RX ORDER — SODIUM CHLORIDE 0.9 % (FLUSH) 0.9 %
10 SYRINGE (ML) INJECTION AS NEEDED
Status: DISCONTINUED | OUTPATIENT
Start: 2024-12-27 | End: 2024-12-30 | Stop reason: HOSPADM

## 2024-12-27 RX ORDER — CITALOPRAM HYDROBROMIDE 20 MG/1
10 TABLET ORAL DAILY
Status: DISCONTINUED | OUTPATIENT
Start: 2024-12-27 | End: 2024-12-27

## 2024-12-27 RX ORDER — CARVEDILOL 6.25 MG/1
6.25 TABLET ORAL 2 TIMES DAILY WITH MEALS
Status: DISCONTINUED | OUTPATIENT
Start: 2024-12-27 | End: 2024-12-30 | Stop reason: HOSPADM

## 2024-12-27 RX ORDER — CITALOPRAM HYDROBROMIDE 40 MG/1
1 TABLET ORAL DAILY
COMMUNITY
Start: 2024-12-19

## 2024-12-27 RX ORDER — SODIUM CHLORIDE 0.9 % (FLUSH) 0.9 %
10 SYRINGE (ML) INJECTION EVERY 12 HOURS SCHEDULED
Status: DISCONTINUED | OUTPATIENT
Start: 2024-12-27 | End: 2024-12-30 | Stop reason: HOSPADM

## 2024-12-27 RX ORDER — SODIUM CHLORIDE, SODIUM LACTATE, POTASSIUM CHLORIDE, CALCIUM CHLORIDE 600; 310; 30; 20 MG/100ML; MG/100ML; MG/100ML; MG/100ML
75 INJECTION, SOLUTION INTRAVENOUS CONTINUOUS
Status: ACTIVE | OUTPATIENT
Start: 2024-12-27 | End: 2024-12-28

## 2024-12-27 RX ORDER — FERROUS SULFATE 325(65) MG
325 TABLET ORAL EVERY OTHER DAY
Status: DISCONTINUED | OUTPATIENT
Start: 2024-12-27 | End: 2024-12-27

## 2024-12-27 RX ORDER — PANTOPRAZOLE SODIUM 40 MG/10ML
40 INJECTION, POWDER, LYOPHILIZED, FOR SOLUTION INTRAVENOUS
Status: DISCONTINUED | OUTPATIENT
Start: 2024-12-27 | End: 2024-12-28

## 2024-12-27 RX ADMIN — Medication 10 ML: at 01:33

## 2024-12-27 RX ADMIN — CARVEDILOL 6.25 MG: 6.25 TABLET, FILM COATED ORAL at 17:20

## 2024-12-27 RX ADMIN — SODIUM CHLORIDE, POTASSIUM CHLORIDE, SODIUM LACTATE AND CALCIUM CHLORIDE 75 ML/HR: 600; 310; 30; 20 INJECTION, SOLUTION INTRAVENOUS at 01:32

## 2024-12-27 RX ADMIN — Medication 10 ML: at 20:16

## 2024-12-27 RX ADMIN — CITALOPRAM HYDROBROMIDE 40 MG: 20 TABLET ORAL at 09:34

## 2024-12-27 RX ADMIN — SODIUM CHLORIDE 125 MG: 9 INJECTION, SOLUTION INTRAVENOUS at 09:34

## 2024-12-27 RX ADMIN — CARVEDILOL 6.25 MG: 6.25 TABLET, FILM COATED ORAL at 09:34

## 2024-12-27 RX ADMIN — SODIUM CHLORIDE 1000 MG: 9 INJECTION INTRAMUSCULAR; INTRAVENOUS; SUBCUTANEOUS at 20:15

## 2024-12-27 RX ADMIN — PANTOPRAZOLE SODIUM 40 MG: 40 INJECTION, POWDER, FOR SOLUTION INTRAVENOUS at 17:13

## 2024-12-27 NOTE — CONSULTS
[facility]  Initial Inpatient Consult Note    Referring Provider: Hospitalist    Reason for Consultation: Anemia    Subjective     History of present illness:    79 y.o. female recently diagnosed with adenocarcinoma of the lung confirmed by biopsy but found to have involvement of bilateral lungs on imaging.  Pathology suggest above 90% likelihood of GI source.  She was seen by oncology on  and also noted to have multiple small pulmonary emboli bilaterally as well as portal vein thrombus.  The patient was therefore started on Eliquis.  She presented yesterday with complaints of dizziness and does report some dark stools which she attributes to starting iron.  She has not had EGD or colonoscopy previously and did not wish to pursue GI evaluation or chemotherapy given her recent diagnosis.  She denies significant weight loss or hematemesis.  Her CEA was 140.  CT of the abdomen also showed mild to moderate amount of pelvic ascites and numerous liver lesions including a 4 cm lesion and a larger 6.7 cm lesion compatible with neoplasm    Past Medical History:  Past Medical History:   Diagnosis Date    Depression     Hypertension     Lung cancer      Past Surgical History:  Past Surgical History:   Procedure Laterality Date    BRONCHOSCOPY N/A 10/15/2024    Procedure: BRONCHOSCOPY WITH ENDOBRONCHIAL ULTRASOUND W/ FNA, BAL, BIOPSIES;  Surgeon: Corey Naik MD;  Location: [facility] ENDOSCOPY;  Service: Pulmonary;  Laterality: N/A;  LUNG NODULES, MEDIASTINAL LYMPHADENOPATHY      Social History:   Social History     Tobacco Use    Smoking status: Former     Current packs/day: 0.00     Average packs/day: 0.5 packs/day for 20.0 years (10.0 ttl pk-yrs)     Types: Cigarettes     Start date:      Quit date: 2004     Years since quittin.0    Smokeless tobacco: Never   Substance Use Topics    Alcohol use: Not Currently      Family History:  Family History   Problem Relation Age of Onset     Cancer Other         son       Home Meds:  Medications Prior to Admission   Medication Sig Dispense Refill Last Dose/Taking    apixaban (ELIQUIS) 5 MG tablet tablet Take 1 tablet by mouth 2 (Two) Times a Day. 60 tablet 1 12/25/2024    carvedilol (COREG) 12.5 MG tablet Take 1 tablet by mouth Every 12 (Twelve) Hours.   12/25/2024    citalopram (CeleXA) 40 MG tablet Take 1 tablet by mouth Daily.   12/25/2024    ferrous gluconate (FERGON) 324 MG tablet Take 1 tablet by mouth Every 12 (Twelve) Hours.   12/25/2024     Current Meds:   [Held by provider] apixaban, 5 mg, Oral, BID  carvedilol, 6.25 mg, Oral, BID With Meals  cefTRIAXone, 1,000 mg, Intravenous, Nightly  citalopram, 40 mg, Oral, Daily  pantoprazole, 40 mg, Intravenous, BID AC  sodium chloride, 10 mL, Intravenous, Q12H      Allergies:  No Known Allergies  Review of Systems  Pertinent items are noted in HPI, all other systems reviewed and negative         Vital Signs  Temp:  [97.7 °F (36.5 °C)-98.8 °F (37.1 °C)] 98.6 °F (37 °C)  Heart Rate:  [] 87  Resp:  [15-20] 20  BP: (118-169)/(67-89) 132/73  Physical Exam:  General Appearance:    Alert, cooperative, in no acute distress   Head:    Normocephalic, without obvious abnormality, atraumatic   Eyes:          conjunctivae and sclerae normal, no   icterus   Throat:   no thrush, oral mucosa moist   Neck:   Supple, no adenopathy   Lungs:     Clear to auscultation bilaterally    Heart:    Regular rhythm and normal rate    Chest Wall:    No abnormalities observed   Abdomen:     Soft, nondistended, nontender; normal bowel sounds   Extremities:   no edema, no redness   Skin:   No bruising or rash   Psychiatric:  normal mood and insight     Results Review:  [x]  Laboratory   [x]  Radiology  []  Pathology      I reviewed the patient's new clinical results.    Results from last 7 days   Lab Units 12/27/24  0424 12/26/24  1557   WBC 10*3/mm3 10.82* 14.87*   HEMOGLOBIN g/dL 7.0* 8.2*   HEMATOCRIT % 23.4* 28.5*   PLATELETS  "10*3/mm3 319 353     Results from last 7 days   Lab Units 12/27/24  0424 12/26/24  1557   SODIUM mmol/L 137 138   POTASSIUM mmol/L 4.2 4.4   CHLORIDE mmol/L 107 108*   CO2 mmol/L 20.3* 19.0*   BUN mg/dL 30* 32*   CREATININE mg/dL 1.05* 1.10*   CALCIUM mg/dL 8.1* 8.3*   BILIRUBIN mg/dL 0.2 0.4   ALK PHOS U/L 299* 322*   ALT (SGPT) U/L 11 12   AST (SGOT) U/L 23 25   GLUCOSE mg/dL 100* 112*         No results found for: \"LIPASE\"    Radiology:  CT Head Without Contrast    Result Date: 12/26/2024  1.No acute intracranial abnormality identified. 2.Atrophy and chronic small vessel ischemic disease. Electronically Signed: Jayson Rudolph MD  12/26/2024 10:46 PM EST  Workstation ID: [id number]    XR Chest 1 View    Result Date: 12/26/2024  Impression: Stable exam. Multiple noncalcified pulmonary nodules consistent with metastatic disease. 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  Patient's hemoglobin is slightly low today compared to yesterday and she has been having some dark stools in the setting of Eliquis that was started a few weeks ago.  The patient does not wish to pursue an upper endoscopy and she also has indicated she does not wish to pursue any chemotherapy.  Given this decision by her I think hospice would be an appropriate decision.  I think empiric holding her Eliquis due to likely recent GI bleeding.  I will transfuse 2 units packed red blood cells today.  I will defer EGD at the patient's wishes.      I discussed the patients findings and my recommendations with patient and family.    Macario Lane MD            "

## 2024-12-27 NOTE — PLAN OF CARE
Goal Outcome Evaluation:  Plan of Care Reviewed With: patient           Outcome Evaluation: Patient presents with decreased strength, transfers and ambulation.  Skilled physical therapy services will be required to address these mobility deficits.    Anticipated Discharge Disposition (PT): home with home health

## 2024-12-27 NOTE — H&P (VIEW-ONLY)
[facility]  Initial Inpatient Consult Note    Referring Provider: Hospitalist    Reason for Consultation: Anemia    Subjective     History of present illness:    79 y.o. female recently diagnosed with adenocarcinoma of the lung confirmed by biopsy but found to have involvement of bilateral lungs on imaging.  Pathology suggest above 90% likelihood of GI source.  She was seen by oncology on  and also noted to have multiple small pulmonary emboli bilaterally as well as portal vein thrombus.  The patient was therefore started on Eliquis.  She presented yesterday with complaints of dizziness and does report some dark stools which she attributes to starting iron.  She has not had EGD or colonoscopy previously and did not wish to pursue GI evaluation or chemotherapy given her recent diagnosis.  She denies significant weight loss or hematemesis.  Her CEA was 140.  CT of the abdomen also showed mild to moderate amount of pelvic ascites and numerous liver lesions including a 4 cm lesion and a larger 6.7 cm lesion compatible with neoplasm    Past Medical History:  Past Medical History:   Diagnosis Date    Depression     Hypertension     Lung cancer      Past Surgical History:  Past Surgical History:   Procedure Laterality Date    BRONCHOSCOPY N/A 10/15/2024    Procedure: BRONCHOSCOPY WITH ENDOBRONCHIAL ULTRASOUND W/ FNA, BAL, BIOPSIES;  Surgeon: Corey Naik MD;  Location: [facility] ENDOSCOPY;  Service: Pulmonary;  Laterality: N/A;  LUNG NODULES, MEDIASTINAL LYMPHADENOPATHY      Social History:   Social History     Tobacco Use    Smoking status: Former     Current packs/day: 0.00     Average packs/day: 0.5 packs/day for 20.0 years (10.0 ttl pk-yrs)     Types: Cigarettes     Start date:      Quit date: 2004     Years since quittin.0    Smokeless tobacco: Never   Substance Use Topics    Alcohol use: Not Currently      Family History:  Family History   Problem Relation Age of Onset     Cancer Other         son       Home Meds:  Medications Prior to Admission   Medication Sig Dispense Refill Last Dose/Taking    apixaban (ELIQUIS) 5 MG tablet tablet Take 1 tablet by mouth 2 (Two) Times a Day. 60 tablet 1 12/25/2024    carvedilol (COREG) 12.5 MG tablet Take 1 tablet by mouth Every 12 (Twelve) Hours.   12/25/2024    citalopram (CeleXA) 40 MG tablet Take 1 tablet by mouth Daily.   12/25/2024    ferrous gluconate (FERGON) 324 MG tablet Take 1 tablet by mouth Every 12 (Twelve) Hours.   12/25/2024     Current Meds:   [Held by provider] apixaban, 5 mg, Oral, BID  carvedilol, 6.25 mg, Oral, BID With Meals  cefTRIAXone, 1,000 mg, Intravenous, Nightly  citalopram, 40 mg, Oral, Daily  pantoprazole, 40 mg, Intravenous, BID AC  sodium chloride, 10 mL, Intravenous, Q12H      Allergies:  No Known Allergies  Review of Systems  Pertinent items are noted in HPI, all other systems reviewed and negative         Vital Signs  Temp:  [97.7 °F (36.5 °C)-98.8 °F (37.1 °C)] 98.6 °F (37 °C)  Heart Rate:  [] 87  Resp:  [15-20] 20  BP: (118-169)/(67-89) 132/73  Physical Exam:  General Appearance:    Alert, cooperative, in no acute distress   Head:    Normocephalic, without obvious abnormality, atraumatic   Eyes:          conjunctivae and sclerae normal, no   icterus   Throat:   no thrush, oral mucosa moist   Neck:   Supple, no adenopathy   Lungs:     Clear to auscultation bilaterally    Heart:    Regular rhythm and normal rate    Chest Wall:    No abnormalities observed   Abdomen:     Soft, nondistended, nontender; normal bowel sounds   Extremities:   no edema, no redness   Skin:   No bruising or rash   Psychiatric:  normal mood and insight     Results Review:  [x]  Laboratory   [x]  Radiology  []  Pathology      I reviewed the patient's new clinical results.    Results from last 7 days   Lab Units 12/27/24  0424 12/26/24  1557   WBC 10*3/mm3 10.82* 14.87*   HEMOGLOBIN g/dL 7.0* 8.2*   HEMATOCRIT % 23.4* 28.5*   PLATELETS  "10*3/mm3 319 353     Results from last 7 days   Lab Units 12/27/24  0424 12/26/24  1557   SODIUM mmol/L 137 138   POTASSIUM mmol/L 4.2 4.4   CHLORIDE mmol/L 107 108*   CO2 mmol/L 20.3* 19.0*   BUN mg/dL 30* 32*   CREATININE mg/dL 1.05* 1.10*   CALCIUM mg/dL 8.1* 8.3*   BILIRUBIN mg/dL 0.2 0.4   ALK PHOS U/L 299* 322*   ALT (SGPT) U/L 11 12   AST (SGOT) U/L 23 25   GLUCOSE mg/dL 100* 112*         No results found for: \"LIPASE\"    Radiology:  CT Head Without Contrast    Result Date: 12/26/2024  1.No acute intracranial abnormality identified. 2.Atrophy and chronic small vessel ischemic disease. Electronically Signed: Jayson Rudolph MD  12/26/2024 10:46 PM EST  Workstation ID: [id number]    XR Chest 1 View    Result Date: 12/26/2024  Impression: Stable exam. Multiple noncalcified pulmonary nodules consistent with metastatic disease. 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  Patient's hemoglobin is slightly low today compared to yesterday and she has been having some dark stools in the setting of Eliquis that was started a few weeks ago.  The patient does not wish to pursue an upper endoscopy and she also has indicated she does not wish to pursue any chemotherapy.  Given this decision by her I think hospice would be an appropriate decision.  I think empiric holding her Eliquis due to likely recent GI bleeding.  I will transfuse 2 units packed red blood cells today.  I will defer EGD at the patient's wishes.      I discussed the patients findings and my recommendations with patient and family.    Macario Lane MD            "

## 2024-12-27 NOTE — THERAPY EVALUATION
Acute Care - Physical Therapy Initial Evaluation   D[facility]     Patient Name: Manuelito Stratton  : 1945  MRN: [id number]  Today's Date: 2024     Admit date: 2024     Referring Physician: Hi Boo MD     Surgery Date:* No surgery found *             Visit Dx:     ICD-10-CM ICD-9-CM   1. Acute UTI  N39.0 599.0   2. Sepsis, due to unspecified organism, unspecified whether acute organ dysfunction present  A41.9 038.9     995.91   3. Decreased activities of daily living (ADL)  Z78.9 V49.89   4. Difficulty in walking  R26.2 719.7     Patient Active Problem List   Diagnosis    Syncope    Abnormal CT of the chest    Multiple lung nodules on CT    Iron deficiency anemia    Malabsorption due to intolerance, not elsewhere classified    Dizziness     Past Medical History:   Diagnosis Date    Depression     Hypertension     Lung cancer      Past Surgical History:   Procedure Laterality Date    BRONCHOSCOPY N/A 10/15/2024    Procedure: BRONCHOSCOPY WITH ENDOBRONCHIAL ULTRASOUND W/ FNA, BAL, BIOPSIES;  Surgeon: Corey Naik MD;  Location: [facility] ENDOSCOPY;  Service: Pulmonary;  Laterality: N/A;  LUNG NODULES, MEDIASTINAL LYMPHADENOPATHY     PT Assessment (Last 12 Hours)       PT Evaluation and Treatment       Row Name 24 1400          Physical Therapy Time and Intention    Subjective Information no complaints  -ATIYA     Document Type evaluation  -ATIYA     Mode of Treatment individual therapy;physical therapy  -ATIYA     Patient Effort good  -ATIYA       Row Name 24 1400          General Information    Patient Observations alert;cooperative;agree to therapy  -ATIYA     Prior Level of Function independent:;all household mobility;community mobility  -ATIYA     Equipment Currently Used at Home none  -ATIYA     Existing Precautions/Restrictions fall  -ATIYA     Barriers to Rehab none identified  -ATIYA       Row Name 24 1400          Living Environment    Current Living Arrangements home  -ATIYA       Row Name 24  1400          Range of Motion (ROM)    Range of Motion ROM is WFL  -ATIYA       Row Name 12/27/24 1400          Strength (Manual Muscle Testing)    Strength (Manual Muscle Testing) strength is WFL  -ATIYA       Row Name 12/27/24 1400          Bed Mobility    Bed Mobility bed mobility (all) activities;supine-sit  -ATIYA     All Activities, [place] (Bed Mobility) standby assist  -ATIYA     Supine-Sit [place] (Bed Mobility) standby assist  -ATIYA       Row Name 12/27/24 1400          Transfers    Transfers bed-chair transfer;sit-stand transfer  -ATIYA       Row Name 12/27/24 1400          Bed-Chair Transfer    Bed-Chair [place] (Transfers) contact guard  -ATIYA     Assistive Device (Bed-Chair Transfers) walker, front-wheeled  -ATIYA       Row Name 12/27/24 1400          Sit-Stand Transfer    Sit-Stand [place] (Transfers) contact guard  -ATIAY     Assistive Device (Sit-Stand Transfers) walker, front-wheeled  -ATIYA       Row Name 12/27/24 1400          Gait/Stairs (Locomotion)    Gait/Stairs Locomotion gait/ambulation assistive device  -ATIYA     [place] Level (Gait) contact guard  -ATIYA     Assistive Device (Gait) walker, front-wheeled  -ATIYA     Patient was able to Ambulate yes  -ATIYA     Distance in Feet (Gait) 100  -ATIYA       Row Name 12/27/24 1400          Safety Issues/Impairments Affecting Functional Mobility    Impairments Affecting Function (Mobility) balance;endurance/activity tolerance  -AITYA       Row Name 12/27/24 1400          Balance    Balance Assessment standing dynamic balance  -ATIYA     Dynamic Standing Balance contact guard  -ATIYA     Position/Device Used, Standing Balance walker, front-wheeled  -ATIYA       Row Name 12/27/24 1400          Plan of Care Review    Plan of Care Reviewed With patient  -ATIYA     Outcome Evaluation Patient presents with decreased strength, transfers and ambulation.  Skilled physical therapy services will be required to address these mobility deficits.  -ATIYA       Row Name 12/27/24 1400           Therapy Assessment/Plan (PT)    Rehab Potential (PT) good  -ATIYA     Criteria for Skilled Interventions Met (PT) skilled treatment is necessary  -ATIYA     Therapy Frequency (PT) daily  -ATIYA     Predicted Duration of Therapy Intervention (PT) 10 days  -ATIYA     Problem List (PT) problems related to;balance;mobility;strength;pain  -ATIYA     Activity Limitations Related to Problem List (PT) unable to transfer safely;unable to ambulate safely  -ATIYA       Row Name 12/27/24 1400          PT Evaluation Complexity    History, PT Evaluation Complexity no personal factors and/or comorbidities  -ATIYA     Examination of Body Systems (PT Eval Complexity) total of 4 or more elements  -ATIYA     Clinical Presentation (PT Evaluation Complexity) stable  -ATIYA     Clinical Decision Making (PT Evaluation Complexity) low complexity  -ATIYA     Overall Complexity (PT Evaluation Complexity) low complexity  -ATIYA       Row Name 12/27/24 1400          Therapy Plan Review/Discharge Plan (PT)    Therapy Plan Review (PT) evaluation/treatment results reviewed;participants voiced agreement with care plan;participants included;patient  -ATIYA       Row Name 12/27/24 1400          Physical Therapy Goals    Transfer Goal Selection (PT) transfer, PT goal 1  -ATIYA     Gait Training Goal Selection (PT) gait training, PT goal 1  -ATIYA       Row Name 12/27/24 1400          Transfer Goal 1 (PT)    Activity/Assistive Device (Transfer Goal 1, PT) transfers, all  -ATIYA     [place] Level/Cues Needed (Transfer Goal 1, PT) independent  -ATIYA     Time Frame (Transfer Goal 1, PT) long term goal (LTG);10 days  -ATIYA       Row Name 12/27/24 1400          Gait Training Goal 1 (PT)    Activity/Assistive Device (Gait Training Goal 1, PT) gait (walking locomotion);assistive device use;walker, rolling  -ATIYA     [place] Level (Gait Training Goal 1, PT) independent  -ATIYA     Distance (Gait Training Goal 1, PT) 300  -ATIYA     Time Frame (Gait Training Goal 1, PT) long term goal (LTG);10 days  -ATIYA                User Key  (r) = Recorded By, (t) = Taken By, (c) = Cosigned By      Initials Name Provider Type    Collin Kraft PT Physical Therapist                    Physical Therapy Education        No education to display                  PT Recommendation and Plan  Anticipated Discharge Disposition (PT): home with home health  Planned Therapy Interventions (PT): balance training, bed mobility training, gait training, home exercise program, stair training, strengthening, transfer training  Therapy Frequency (PT): daily  Plan of Care Reviewed With: patient  Outcome Evaluation: Patient presents with decreased strength, transfers and ambulation.  Skilled physical therapy services will be required to address these mobility deficits.   Outcome Measures       Row Name 12/27/24 1400             How much help from another person do you currently need...    Turning from your back to your side while in flat bed without using bedrails? 4  -ATIYA      Moving from lying on back to sitting on the side of a flat bed without bedrails? 4  -ATIYA      Moving to and from a bed to a chair (including a wheelchair)? 3  -ATIYA      Standing up from a chair using your arms (e.g., wheelchair, bedside chair)? 3  -ATIYA      Climbing 3-5 steps with a railing? 3  -ATIYA      To walk in hospital room? 3  -ATIYA      AM-PAC 6 Clicks Score (PT) 20  -ATIYA         Functional Assessment    Outcome Measure Options AM-PAC 6 Clicks Basic Mobility (PT)  -ATIYA                User Key  (r) = Recorded By, (t) = Taken By, (c) = Cosigned By      Initials Name Provider Type    Collin Kraft PT Physical Therapist                     Time Calculation:    PT Charges       Row Name 12/27/24 1403             Time Calculation    PT Received On 12/27/24  -ATIYA      PT Goal Re-Cert Due Date 01/05/25  -ATIYA         Untimed Charges    PT Eval/Re-eval Minutes 20  -ATIYA         Total Minutes    Untimed Charges Total Minutes 20  -ATIYA       Total Minutes 20  -ATIYA                User  Key  (r) = Recorded By, (t) = Taken By, (c) = Cosigned By      Initials Name Provider Type    Collin Kraft PT Physical Therapist                      PT G-Codes  Outcome Measure Options: AM-PAC 6 Clicks Basic Mobility (PT)  AM-PAC 6 Clicks Score (PT): 20  AM-PAC 6 Clicks Score (OT): 21    Collin Currie PT  12/27/2024

## 2024-12-27 NOTE — PROGRESS NOTES
[facility]   Hospitalist Progress Note  Date: 2024  Patient Name: Manuelito Stratton  : 1945  MRN: [id number]  Date of admission: 2024  Consultants:   -Gastroenterology: Dr. Macario Lane    Subjective   Subjective     Chief Complaint: Dizziness    Summary:   Manuelito Stratton is a 79 y.o. 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  Eval in ED significant for urinalysis consistent with UTI and orthostatic vital signs were noted to be positive.  Labs did show anemia.  Hospitalist service contacted for further evaluation and management.  Empiric antibiotic for UTI started.  Patient did endorse melena and frequent/daily use of ibuprofen.  Open Gastroenterology consulted.    Interval Followup:   No acute events overnight.  Patient said that her dizziness had improved.  She was able to work with therapies and denies any recurrent symptoms.  Hemoglobin did decrease on a.m. labs.  Patient did endorse melena.  Nursing with no additional acute issues to report.    Antibiotics:   -Ceftriaxone    Objective   Objective     Vitals:   Temp:  [97.7 °F (36.5 °C)-98.8 °F (37.1 °C)] 98.6 °F (37 °C)  Heart Rate:  [] 109  Resp:  [15-20] 20  BP: (118-169)/(67-89) 118/70  Physical Exam   Gen: No acute distress, Conversant, Pleasant, sitting up in bed  Resp: CTAB, No w/r/r, No respiratory distress appreciated  Card: Regular rhythm, tachycardic, No m/r/g  Abd: Soft, Nontender, Nondistended, + bowel sounds    Result Review    Result Review:  I have personally reviewed the results as below and agree with these findings:  []  Laboratory:   CMP          2024    12:26 2024    15:57 2024    04:24   CMP   Glucose 124  112  100    BUN 32  32  30    Creatinine 1.18  1.10  1.05    EGFR 47.4  51.5  54.5    Sodium 138  138  137    Potassium 4.4  4.4  4.2    Chloride 107  108   107    Calcium 8.7  8.3  8.1    Total Protein 6.2  6.3  5.5    Albumin 3.0  2.8  2.4    Globulin 3.2  3.5  3.1    Total Bilirubin 0.3  0.4  0.2    Alkaline Phosphatase 264  322  299    AST (SGOT) 25  25  23    ALT (SGPT) 11  12  11    Albumin/Globulin Ratio 0.9  0.8  0.8    BUN/Creatinine Ratio 27.1  29.1  28.6    Anion Gap 7.6  11.0  9.7      CBC          12/17/2024    12:26 12/26/2024    15:57 12/27/2024    04:24   CBC   WBC 8.58  14.87  10.82    RBC 2.60  2.75  2.40    Hemoglobin 7.4  8.2  7.0    Hematocrit 24.5  28.5  23.4    MCV 94.2  103.6  97.5    MCH 28.5  29.8  29.2    MCHC 30.2  28.8  29.9    RDW 17.1  18.3  18.2    Platelets 343  353  319    Phosphorus and magnesium within normal limits.  Iron level low.  [x]  Microbiology: Urine culture (12/26/2024): Pending.  Blood culture (12/26/2024): Pending.  []  Radiology:   []  EKG/Telemetry:    []  Cardiology/Vascular:    []  Pathology:  []  Old records:  []  Other:    Assessment & Plan   Assessment / Plan     Assessment:  Urinary tract infection due to unknown infectious organism  Iron deficiency anemia  Concern for GI bleed  Dizziness  Suspected stage IV non-small cell lung cancer   Essential hypertension  Depression  History of PE and portal vein thrombus on Eliquis    Plan:  -IV iron infusion ordered  -Monitor hemoglobin and transfuse accordingly maintain greater than 7  -Patient did endorse use of ibuprofen daily/frequently.  Patient also endorsed melena.  Given anemia use of NSAIDs and melena will consult gastroenterology for evaluation  -IV Protonix ordered  -Continue ceftriaxone.  Tailor based on results of infectious workup  -Start appropriate home medications  -Hold home apixaban  -PT/OT consulted  -Will monitor electrolytes and renal function with BMP and magnesium level in the AM  -Will monitor WBC and Hgb with CBC in the AM  -Clinical course will dictate further management     DVT Prophylaxis: SCDs  GI Prophylaxis: Pantoprazole  Diet:   Diet Order    Procedures    Diet: Regular/House; Fluid Consistency: Thin (IDDSI 0)     Dispo: Home when medically appropriate for discharge     Time spent personally reviewing patient's chart, labs and imaging, evaluating/examining the patient, discussing care plan with patient and nurse at bedside and discussing management with consultants (Gastroenterology): 51 minutes.     Part of this note may be an electronic transcription/translation of spoken language to printed text using the Dragon dictation system.    VTE Prophylaxis:  Pharmacologic VTE prophylaxis orders are present.        CODE STATUS:   Level Of Support Discussed With: Patient  Code Status (Patient has no pulse and is not breathing): CPR (Attempt to Resuscitate)  Medical Interventions (Patient has pulse or is breathing): Full Support        Electronically signed by Hi Boo MD, 12/27/2024, 09:54 EST.

## 2024-12-27 NOTE — CONSULTS
"Purpose of the visit was to evaluate for: goals of care/advanced care planning and hospice referral/discussion. Spoke with RN, patient, and family and discussed palliative care, goals of care, care options, resuscitation status, advanced care planning, clarify code status, and determination of decision maker.      Assessment: Ms Stratton has a hx of metastatic non-small cell lung cancer, essential hypertension, depression and history of PE and portal vein thrombus on Eliquis who presented with complaints of dizziness. Eval in ED on 12-26 significant for urinalysis consistent with UTI and orthostatic vital signs were noted to be positive. Labs did show anemia. She was admitted for further monitoring and treatment.   I visited her in her room on [address]. She is alert and oriented x 4. Her son is at bedside. I introduced myself and the roll of palliative care. We discussed her recent refusal of scopes and treatment for her possible gi bleed and cancer. She tells me s he has changed her mind as her children have made her \"feel bad for refusing\". She tells me her son Carroll is her HCS but there is no ACP on file. They are to bring in a copy. She tells me she wants to remain a full code but does not want to live on life support. I have encouraged her to speak in detail with her son, her wishes for care if she were unable to make those decisions.   Palliative care will follow up to assure a copy of the living will or other documents are brought in or a new ACP can be completed.    Recommendations/Plan:  further decisions dependent on clinical course  .    Tasks Completed: Code Status clarification and Emotional Support..  Katlyn OTERO RN, BSN  Palliative Care  "

## 2024-12-27 NOTE — THERAPY EVALUATION
Patient Name: Manuelito Stratton  : 1945    MRN: [id number]                              Today's Date: 2024       Admit Date: 2024    Visit Dx:     ICD-10-CM ICD-9-CM   1. Acute UTI  N39.0 599.0   2. Sepsis, due to unspecified organism, unspecified whether acute organ dysfunction present  A41.9 038.9     995.91   3. Decreased activities of daily living (ADL)  Z78.9 V49.89     Patient Active Problem List   Diagnosis    Syncope    Abnormal CT of the chest    Multiple lung nodules on CT    Iron deficiency anemia    Malabsorption due to intolerance, not elsewhere classified    Dizziness     Past Medical History:   Diagnosis Date    Depression     Hypertension     Lung cancer      Past Surgical History:   Procedure Laterality Date    BRONCHOSCOPY N/A 10/15/2024    Procedure: BRONCHOSCOPY WITH ENDOBRONCHIAL ULTRASOUND W/ FNA, BAL, BIOPSIES;  Surgeon: Corey Naik MD;  Location: [facility] ENDOSCOPY;  Service: Pulmonary;  Laterality: N/A;  LUNG NODULES, MEDIASTINAL LYMPHADENOPATHY      General Information       Row Name 24 1056          OT Time and Intention    Document Type evaluation  -AV     Mode of Treatment individual therapy;occupational therapy  -AV       Row Name 24 1056          General Information    Patient Profile Reviewed yes  -AV     Prior Level of Function independent:;ADL's;transfer;all household mobility  Sits to bathe (tub with grab bar-no seat).  Stands at sink to groom.  Standard commode.  Ambulates without assistive device.  Daughter-in-law assist with home management tasks.  No home oxygen.  -AV     Existing Precautions/Restrictions fall  -AV     Barriers to Rehab none identified  -AV       Row Name 24 1056          Occupational Profile    Reason for Services/Referral (Occupational Profile) Patient is a 79 year old female admitted [facility] on 2024 with dizziness.  She is currently on 2N/ room air.   OT consulted due to recent decline in ADL/transfer  independence.  No previous OT services for current condition.  -AV       Row Name 12/27/24 1056          Living Environment    People in Home alone  -AV       Row Name 12/27/24 1056          Home Main Entrance    Number of Stairs, Main Entrance one  -AV       Row Name 12/27/24 1056          Stairs Within Home, Primary    Number of Stairs, Within Home, Primary none  -AV       Row Name 12/27/24 1056          Cognition    Orientation Status (Cognition) --  Patient is alert, pleasant and cooperative- able to retain information and follow commands.  -AV       Row Name 12/27/24 1056          Safety Issues/Impairments Affecting Functional Mobility    Impairments Affecting Function (Mobility) balance;endurance/activity tolerance  -AV               User Key  (r) = Recorded By, (t) = Taken By, (c) = Cosigned By      Initials Name Provider Type    Mandeep Linn OT Occupational Therapist                     Mobility/ADL's       Row Name 12/27/24 1058          Bed Mobility    Bed Mobility supine-sit-supine  -AV     Supine-Sit-Supine [place] (Bed Mobility) independent  -AV     Assistive Device (Bed Mobility) bed rails  -AV       Row Name 12/27/24 1058          Transfers    Comment, (Transfers) CGA/RW  -AV       Row Name 12/27/24 1058          Activities of Daily Living    BADL Assessment/Intervention --  Independent feeding and grooming with set up while seated (decreased intake x one month). CGA/ min assist bathing/dressing.  CGA toilet hygiene (ambulates to bathroom instead of BSC use).  -AV               User Key  (r) = Recorded By, (t) = Taken By, (c) = Cosigned By      Initials Name Provider Type    Mandeep Linn OT Occupational Therapist                   Obj/Interventions       Row Name 12/27/24 1059          Sensory Assessment (Somatosensory)    Sensory Assessment (Somatosensory) UE sensation intact  -AV       Row Name 12/27/24 1059          Vision Assessment/Intervention    Visual Impairment/Limitations  WFL  -AV       Row Name 12/27/24 1059          Range of Motion Comprehensive    General Range of Motion bilateral upper extremity ROM WFL  -AV     Comment, General Range of Motion AROM  -AV       Row Name 12/27/24 1059          Strength Comprehensive (MMT)    Comment, General Manual Muscle Testing (MMT) Assessment 4/5 bilateral biceps, triceps and   -AV       Row Name 12/27/24 1059          Motor Skills    Motor Skills coordination;functional endurance  -AV     Coordination WFL  Right dominant  -AV     Functional Endurance Fair  -AV       Row Name 12/27/24 1059          Balance    Comment, Balance CGA/RW  -AV               User Key  (r) = Recorded By, (t) = Taken By, (c) = Cosigned By      Initials Name Provider Type    AV Mandeep Hinds OT Occupational Therapist                   Goals/Plan       Row Name 12/27/24 1100          Transfer Goal 1 (OT)    Activity/Assistive Device (Transfer Goal 1, OT) transfers, all;walker, rolling  -AV     [place] Level/Cues Needed (Transfer Goal 1, OT) modified independence  -AV     Time Frame (Transfer Goal 1, OT) long term goal (LTG);10 days  -AV       Row Name 12/27/24 1100          Bathing Goal 1 (OT)    Activity/Device (Bathing Goal 1, OT) bathing skills, all;tub bench  -AV     [place] Level/Cues Needed (Bathing Goal 1, OT) modified independence  -AV     Time Frame (Bathing Goal 1, OT) long term goal (LTG);10 days  -AV       Row Name 12/27/24 1100          Dressing Goal 1 (OT)    Activity/Device (Dressing Goal 1, OT) dressing skills, all  -AV     [place]/Cues Needed (Dressing Goal 1, OT) modified independence  -AV     Time Frame (Dressing Goal 1, OT) long term goal (LTG);10 days  -AV       Row Name 12/27/24 1100          Toileting Goal 1 (OT)    Activity/Device (Toileting Goal 1, OT) toileting skills, all;raised toilet seat  -AV     [place] Level/Cues Needed (Toileting Goal 1, OT) modified independence  -AV     Time Frame (Toileting Goal 1, OT) long  term goal (LTG);10 days  -AV       Row Name 12/27/24 1100          Grooming Goal 1 (OT)    Activity/Device (Grooming Goal 1, OT) grooming skills, all  standing at sink  -AV     [place] (Grooming Goal 1, OT) modified independence  -AV     Time Frame (Grooming Goal 1, OT) long term goal (LTG);10 days  -AV       Row Name 12/27/24 1100          Problem Specific Goal 1 (OT)    Problem Specific Goal 1 (OT) Patient will demonstrate fair plus endurance/activity tolerance needed to support ADLs.  -AV     Time Frame (Problem Specific Goal 1, OT) long term goal (LTG);10 days  -AV       Row Name 12/27/24 1100          Therapy Assessment/Plan (OT)    Planned Therapy Interventions (OT) activity tolerance training;BADL retraining;functional balance retraining;occupation/activity based interventions;patient/caregiver education/training;ROM/therapeutic exercise;transfer/mobility retraining  -AV               User Key  (r) = Recorded By, (t) = Taken By, (c) = Cosigned By      Initials Name Provider Type    AV Mandeep Hinds, NATTY Occupational Therapist                   Clinical Impression       Row Name 12/27/24 1059          Pain Assessment    Pretreatment Pain Rating 0/10 - no pain  -AV     Posttreatment Pain Rating 0/10 - no pain  -AV       Row Name 12/27/24 1059          Plan of Care Review    Plan of Care Reviewed With patient  -AV     Progress no change  First session for evaluation  -AV     Outcome Evaluation Patient presents with limitations of balance and endurance/activity tolerance which impede her ability to perform ADL and transfers as prior.  The skills of a therapist will be required to safely and effectively implement treatment plan to restore maximal level of function.  -AV       Row Name 12/27/24 1059          Therapy Assessment/Plan (OT)    Patient/Family Therapy Goal Statement (OT) Regain independence  -AV     Rehab Potential (OT) good  -AV     Criteria for Skilled Therapeutic Interventions Met (OT) yes;meets  criteria;skilled treatment is necessary  -AV     Therapy Frequency (OT) 5 times/wk  -AV       Row Name 12/27/24 1059          Therapy Plan Review/Discharge Plan (OT)    Equipment Needs Upon Discharge (OT) walker, rolling;commode chair;tub bench  -AV     Anticipated Discharge Disposition (OT) inpatient rehabilitation facility  -AV       Row Name 12/27/24 1059          Vital Signs    O2 Delivery Pre Treatment room air  -AV     O2 Delivery Intra Treatment room air  -AV     O2 Delivery Post Treatment room air  -AV       Row Name 12/27/24 1059          Positioning and Restraints    Pre-Treatment Position in bed  -AV     Post Treatment Position bed  -AV     In Bed call light within reach;encouraged to call for assist;exit alarm on  -AV               User Key  (r) = Recorded By, (t) = Taken By, (c) = Cosigned By      Initials Name Provider Type    Mandeep Linn, NATTY Occupational Therapist                   Outcome Measures       Row Name 12/27/24 1102          How much help from another is currently needed...    Putting on and taking off regular lower body clothing? 3  -AV     Bathing (including washing, rinsing, and drying) 3  -AV     Toileting (which includes using toilet bed pan or urinal) 3  -AV     Putting on and taking off regular upper body clothing 4  -AV     Taking care of personal grooming (such as brushing teeth) 4  -AV     Eating meals 4  -AV     AM-PAC 6 Clicks Score (OT) 21  -AV       Row Name 12/27/24 0710 12/27/24 0047       How much help from another person do you currently need...    Turning from your back to your side while in flat bed without using bedrails? 4  -MH 4  -RK    Moving from lying on back to sitting on the side of a flat bed without bedrails? 3  - 3  -RK    Moving to and from a bed to a chair (including a wheelchair)? 3  -MH 3  -RK    Standing up from a chair using your arms (e.g., wheelchair, bedside chair)? 3  -MH 3  -RK    Climbing 3-5 steps with a railing? 3  -MH 3  -RK    To walk  in hospital room? 3  - 3  -RK    AM-PAC 6 Clicks Score (PT) 19  -MH 19  -RK      Row Name 12/27/24 1102          Functional Assessment    Outcome Measure Options AM-PAC 6 Clicks Daily Activity (OT);Optimal Instrument  -AV       Row Name 12/27/24 1102          Optimal Instrument    Optimal Instrument Optimal - 3  -AV     Bending/Stooping 1  -AV     Standing 2  -AV     Reaching 1  -AV     From the list, choose the 3 activities you would most like to be able to do without any difficulty Bending/stooping;Standing;Reaching  -AV     Total Score Optimal - 3 4  -AV               User Key  (r) = Recorded By, (t) = Taken By, (c) = Cosigned By      Initials Name Provider Type    Jaymie Patrick, RN Registered Nurse    Yissel Mclaughlin, RN Registered Nurse    Mandeep Linn, NATTY Occupational Therapist                    Occupational Therapy Education       Title: PT OT SLP Therapies (Done)       Topic: Occupational Therapy (Done)       Point: ADL training (Done)       Description:   Instruct learner(s) on proper safety adaptation and remediation techniques during self care or transfers.   Instruct in proper use of assistive devices.                  Learning Progress Summary            Patient Acceptance, E, VU by LLOYD at 12/27/2024 1102                      Point: Home exercise program (Done)       Description:   Instruct learner(s) on appropriate technique for monitoring, assisting and/or progressing therapeutic exercises/activities.                  Learning Progress Summary            Patient Acceptance, E, VU by LLOYD at 12/27/2024 1102                      Point: Precautions (Done)       Description:   Instruct learner(s) on prescribed precautions during self-care and functional transfers.                  Learning Progress Summary            Patient Acceptance, E, VU by LLOYD at 12/27/2024 1102                      Point: Body mechanics (Done)       Description:   Instruct learner(s) on proper positioning and spine  alignment during self-care, functional mobility activities and/or exercises.                  Learning Progress Summary            Patient Acceptance, E, VU by AV at 12/27/2024 1102                                      User Key       Initials Effective Dates Name Provider Type [facility] 06/16/21 -  Mandeep Hinds OT Occupational Therapist OT                  OT Recommendation and Plan  Planned Therapy Interventions (OT): activity tolerance training, BADL retraining, functional balance retraining, occupation/activity based interventions, patient/caregiver education/training, ROM/therapeutic exercise, transfer/mobility retraining  Therapy Frequency (OT): 5 times/wk  Plan of Care Review  Plan of Care Reviewed With: patient  Progress: no change (First session for evaluation)  Outcome Evaluation: Patient presents with limitations of balance and endurance/activity tolerance which impede her ability to perform ADL and transfers as prior.  The skills of a therapist will be required to safely and effectively implement treatment plan to restore maximal level of function.     Time Calculation:   Evaluation Complexity (OT)  Review Occupational Profile/Medical/Therapy History Complexity: expanded/moderate complexity  Assessment, Occupational Performance/Identification of Deficit Complexity: 1-3 performance deficits  Clinical Decision Making Complexity (OT): problem focused assessment/low complexity  Overall Complexity of Evaluation (OT): low complexity     Time Calculation- OT       Row Name 12/27/24 1104             Time Calculation- OT    OT Received On 12/27/24  -AV      OT Goal Re-Cert Due Date 01/05/25  -AV         Untimed Charges    OT Eval/Re-eval Minutes 35  -AV         Total Minutes    Untimed Charges Total Minutes 35  -AV       Total Minutes 35  -AV                User Key  (r) = Recorded By, (t) = Taken By, (c) = Cosigned By      Initials Name Provider Type    Mandeep Linn OT Occupational Therapist                   Therapy Charges for Today       Code Description Service Date Service Provider Modifiers Qty    84325202553 HC OT EVAL LOW COMPLEXITY 3 12/27/2024 Mandeep Hinds, OT GO 1                 Mandeep Hinds OT  12/27/2024

## 2024-12-27 NOTE — PLAN OF CARE
Goal Outcome Evaluation:   Transfer from  this shift. NPO at [facility] for EGD tomorrow. 2U PRBC ordered, first infusing this shift. LR to infuse after blood completed. Will continue current plan of care.

## 2024-12-27 NOTE — NURSING NOTE
Patient transferring to [facility] on [address]. Report was given by Jaymie BROWN at 1145 to Yulia BROWN.

## 2024-12-27 NOTE — ANESTHESIA PREPROCEDURE EVALUATION
Anesthesia Evaluation     Patient summary reviewed and Nursing notes reviewed   no history of anesthetic complications:   NPO Solid Status: > 8 hours  NPO Liquid Status: > 8 hours           Airway   Mallampati: III  TM distance: >3 FB  Neck ROM: full  Dental    (+) edentulous, upper dentures and lower dentures    Pulmonary - normal exam   (+) a smoker Former, lung cancer,  Cardiovascular - normal exam  Exercise tolerance: poor (<4 METS)    PT is on anticoagulation therapy    (+) hypertension, CHF , DVT    ROS comment: Complete Transthoracic Echocardiogram with Complete Doppler and Color Flow    Accession Number: [id number]  Date of Study: 12/11/22  Ordering Provider: Ney Hernandez Jr., MD  Clinical Indications: Syncope      Reading Physicians  Performing Staff  Cardiology: Agustina Walsh MD     Tech: Natalee Lainez      Patient Hx Of Height, Weight, and Vitals    Height Weight BSA (Calculated - sq m) BMI (Calculated) Retired BMI (kg/m2) Pulse BP       75 171/76    PACS Images     Show images for Adult Transthoracic Echo Complete w/ Color, Spectral and Contrast if necessary per protocol [facility] PACS Images     Show images for Adult Transthoracic Echo Complete w/ Color, Spectral and Contrast if necessary per protocol  Clinical Indication    Syncope    Interpretation Summary       ·  Left ventricular systolic function is normal. Left ventricular ejection fraction appears to be 51 - 55%.  ·  Left ventricular wall thickness is consistent with mild concentric hypertrophy.  ·  Left ventricular diastolic function is consistent with (grade Ia w/high LAP) impaired relaxation.  ·  Trace tricuspid valve regurgitation is present         Neuro/Psych  (+) syncope, psychiatric history Depression  GI/Hepatic/Renal/Endo - negative ROS     Musculoskeletal (-) negative ROS    Abdominal  - normal exam   Substance History - negative use     OB/GYN negative ob/gyn ROS         Other      history of cancer    ROS/Med Hx Other:                       Anesthesia Plan    ASA 3     general   total IV anesthesia  (Patient states for the case she would like to be a full code and accepts blood products.)  intravenous induction     Anesthetic plan, risks, benefits, and alternatives have been provided, discussed and informed consent has been obtained with: patient.  Pre-procedure education provided  Plan discussed with attending.        CODE STATUS:    Level Of Support Discussed With: Patient  Code Status (Patient has no pulse and is not breathing): CPR (Attempt to Resuscitate)  Medical Interventions (Patient has pulse or is breathing): Full Support

## 2024-12-27 NOTE — PLAN OF CARE
Goal Outcome Evaluation:  Plan of Care Reviewed With: patient        Progress: no change (First session for evaluation)  Outcome Evaluation: Patient presents with limitations of balance and endurance/activity tolerance which impede her ability to perform ADL and transfers as prior.  The skills of a therapist will be required to safely and effectively implement treatment plan to restore maximal level of function.    Anticipated Discharge Disposition (OT): inpatient rehabilitation facility

## 2024-12-27 NOTE — H&P
[facility] HISTORY AND PHYSICAL  Date: 2024   Patient Name: Manuelito Stratton  : 1945  MRN: [id number]  Primary Care Physician:  Mauro Cross MD  Date of admission: 2024    Subjective   Subjective     Chief Complaint: Dizziness    HPI:    Manuelito Stratton is a 79 y.o. 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  She has decreased appetite and poor p.o. intake.  She believes that she is dehydrated.  She says she is not undergoing any treatment for her cancer currently.  She denies fever, no chills, no chest pain, no diarrhea.      Personal History     Past Medical History:  Past Medical History:   Diagnosis Date    Depression     Hypertension     Lung cancer            Past Surgical History:  Past Surgical History:   Procedure Laterality Date    BRONCHOSCOPY N/A 10/15/2024    Procedure: BRONCHOSCOPY WITH ENDOBRONCHIAL ULTRASOUND W/ FNA, BAL, BIOPSIES;  Surgeon: Corey Naik MD;  Location: [facility] ENDOSCOPY;  Service: Pulmonary;  Laterality: N/A;  LUNG NODULES, MEDIASTINAL LYMPHADENOPATHY       Family History:   Family History   Problem Relation Age of Onset    Cancer Other         son       Social History:   Social History     Socioeconomic History    Marital status: Single   Tobacco Use    Smoking status: Former     Current packs/day: 0.00     Average packs/day: 0.5 packs/day for 20.0 years (10.0 ttl pk-yrs)     Types: Cigarettes     Start date:      Quit date: 2004     Years since quittin.0    Smokeless tobacco: Never   Vaping Use    Vaping status: Never Used   Substance and Sexual Activity    Alcohol use: Not Currently    Drug use: Never    Sexual activity: Defer       Home Medications:  apixaban, carvedilol, citalopram, and ferrous gluconate    Allergies:  No Known Allergies    Review of Systems   All systems  were reviewed and negative except for indicated in HPI    Objective   Objective     Vitals:   Temp:  [97.7 °F (36.5 °C)] 97.7 °F (36.5 °C)  Heart Rate:  [82-96] 96  Resp:  [18] 18  BP: (135-169)/(74-89) 166/78    Physical Exam    Constitutional: Awake, alert, no acute distress   Eyes: Pupils equal, sclerae anicteric, no conjunctival injection   HENT: NCAT, mucous membranes moist   Neck: Supple, no thyromegaly, no lymphadenopathy, trachea midline   Respiratory: Clear to auscultation bilaterally, nonlabored respirations    Cardiovascular: RRR, no murmurs, rubs, or gallops, palpable pedal pulses bilaterally   Gastrointestinal: Positive bowel sounds, soft, nontender, nondistended   Musculoskeletal: No bilateral ankle edema, no clubbing or cyanosis to extremities   Psychiatric: Appropriate affect, cooperative   Neurologic: Oriented x 3, strength symmetric in all extremities, Cranial Nerves grossly intact to confrontation, speech clear   Skin: No rashes     Result Review    Result Review:  I have personally reviewed the results from the time of this admission to 12/26/2024 23:30 EST and agree with these findings:  [x]  Laboratory  [x]  Microbiology  [x]  Radiology  [x]  EKG/Telemetry   []  Cardiology/Vascular   []  Pathology  []  Old records  []  Other:    Laboratory Studies:  Recent Results (from the past 24 hours)   ECG 12 Lead ED Triage Standing Order; Weak / Dizzy / AMS    Collection Time: 12/26/24  3:51 PM   Result Value Ref Range    QT Interval 381 ms    QTC Interval 454 ms   Comprehensive Metabolic Panel    Collection Time: 12/26/24  3:57 PM    Specimen: Arm, Right; Blood   Result Value Ref Range    Glucose 112 (H) 65 - 99 mg/dL    BUN 32 (H) 8 - 23 mg/dL    Creatinine 1.10 (H) 0.57 - 1.00 mg/dL    Sodium 138 136 - 145 mmol/L    Potassium 4.4 3.5 - 5.2 mmol/L    Chloride 108 (H) 98 - 107 mmol/L    CO2 19.0 (L) 22.0 - 29.0 mmol/L    Calcium 8.3 (L) 8.6 - 10.5 mg/dL    Total Protein 6.3 6.0 - 8.5 g/dL    Albumin 2.8  (L) 3.5 - 5.2 g/dL    ALT (SGPT) 12 1 - 33 U/L    AST (SGOT) 25 1 - 32 U/L    Alkaline Phosphatase 322 (H) 39 - 117 U/L    Total Bilirubin 0.4 0.0 - 1.2 mg/dL    Globulin 3.5 gm/dL    A/G Ratio 0.8 g/dL    BUN/Creatinine Ratio 29.1 (H) 7.0 - 25.0    Anion Gap 11.0 5.0 - 15.0 mmol/L    eGFR 51.5 (L) >60.0 mL/min/1.73   High Sensitivity Troponin T    Collection Time: 12/26/24  3:57 PM    Specimen: Arm, Right; Blood   Result Value Ref Range    HS Troponin T 33 (H) <14 ng/L   Magnesium    Collection Time: 12/26/24  3:57 PM    Specimen: Arm, Right; Blood   Result Value Ref Range    Magnesium 2.2 1.6 - 2.4 mg/dL   Green Top (Gel)    Collection Time: 12/26/24  3:57 PM   Result Value Ref Range    Extra Tube Hold for add-ons.    Lavender Top    Collection Time: 12/26/24  3:57 PM   Result Value Ref Range    Extra Tube hold for add-on    Gold Top - SST    Collection Time: 12/26/24  3:57 PM   Result Value Ref Range    Extra Tube Hold for add-ons.    Light Blue Top    Collection Time: 12/26/24  3:57 PM   Result Value Ref Range    Extra Tube Hold for add-ons.    CBC Auto Differential    Collection Time: 12/26/24  3:57 PM    Specimen: Arm, Right; Blood   Result Value Ref Range    WBC 14.87 (H) 3.40 - 10.80 10*3/mm3    RBC 2.75 (L) 3.77 - 5.28 10*6/mm3    Hemoglobin 8.2 (L) 12.0 - 15.9 g/dL    Hematocrit 28.5 (L) 34.0 - 46.6 %    .6 (H) 79.0 - 97.0 fL    MCH 29.8 26.6 - 33.0 pg    MCHC 28.8 (L) 31.5 - 35.7 g/dL    RDW 18.3 (H) 12.3 - 15.4 %    RDW-SD 69.0 (H) 37.0 - 54.0 fl    MPV 9.1 6.0 - 12.0 fL    Platelets 353 140 - 450 10*3/mm3    Neutrophil % 83.6 (H) 42.7 - 76.0 %    Lymphocyte % 7.3 (L) 19.6 - 45.3 %    Monocyte % 7.8 5.0 - 12.0 %    Eosinophil % 0.5 0.3 - 6.2 %    Basophil % 0.3 0.0 - 1.5 %    Immature Grans % 0.5 0.0 - 0.5 %    Neutrophils, Absolute 12.43 (H) 1.70 - 7.00 10*3/mm3    Lymphocytes, Absolute 1.09 0.70 - 3.10 10*3/mm3    Monocytes, Absolute 1.16 (H) 0.10 - 0.90 10*3/mm3    Eosinophils, Absolute 0.07  0.00 - 0.40 10*3/mm3    Basophils, Absolute 0.05 0.00 - 0.20 10*3/mm3    Immature Grans, Absolute 0.07 (H) 0.00 - 0.05 10*3/mm3    nRBC 0.0 0.0 - 0.2 /100 WBC   High Sensitivity Troponin T 1Hr    Collection Time: 12/26/24  8:01 PM    Specimen: Blood   Result Value Ref Range    HS Troponin T 33 (H) <14 ng/L    Troponin T Numeric Delta 0 ng/L    Troponin T % Delta 0 Abnormal if >/= 20% %   Urinalysis With Culture If Indicated - Urine, Clean Catch    Collection Time: 12/26/24 10:31 PM    Specimen: Urine, Clean Catch   Result Value Ref Range    Color, UA Dark Yellow (A) Yellow, Straw    Appearance, UA Cloudy (A) Clear    pH, UA <=5.0 5.0 - 8.0    Specific Gravity, UA 1.023 1.005 - 1.030    Glucose, UA Negative Negative    Ketones, UA Trace (A) Negative    Bilirubin, UA Negative Negative    Blood, UA Negative Negative    Protein, UA Trace (A) Negative    Leuk Esterase, UA Moderate (2+) (A) Negative    Nitrite, UA Negative Negative    Urobilinogen, UA 1.0 E.U./dL 0.2 - 1.0 E.U./dL   Urinalysis, Microscopic Only - Urine, Clean Catch    Collection Time: 12/26/24 10:31 PM    Specimen: Urine, Clean Catch   Result Value Ref Range    RBC, UA 3-5 (A) None Seen, 0-2 /HPF    WBC, UA 21-50 (A) None Seen, 0-2 /HPF    Bacteria, UA 3+ (A) None Seen /HPF    Squamous Epithelial Cells, UA 0-2 None Seen, 0-2 /HPF    Hyaline Casts, UA 7-12 None Seen /LPF    Methodology Automated Microscopy        Imaging:  CT Head Without Contrast    Result Date: 12/26/2024  Narrative: CT HEAD WO CONTRAST HISTORY: Dizziness. History of lung cancer. TECHNIQUE: Axial unenhanced head CT with multiplanar reformats. Radiation dose reduction techniques included automated exposure control or exposure modulation based on body size. COMPARISON: Brain MRI 10/22/2024, CT head 12/10/2022 FINDINGS: Ventricular size and configuration are normal. No acute infarct or hemorrhage is identified. There are no masses. There is no skull fracture. There is atrophy with chronic  small vessel ischemic disease in the white matter. Atherosclerotic calcifications are noted in the vertebral arteries and carotid siphons. There is a chronic mucous retention cyst in the right maxillary sinus. Mastoid air cells and middle ear cavities are clear. There is probably cerumen in the left external auditory canal.     Impression: 1.No acute intracranial abnormality identified. 2.Atrophy and chronic small vessel ischemic disease. Electronically Signed: Jayson Rudolph MD  12/26/2024 10:46 PM EST  Workstation ID: [id number]    XR Chest 1 View    Result Date: 12/26/2024  Narrative: XR CHEST 1 VW Date of Exam: 12/26/2024 4:22 PM EST Indication: Weak/Dizzy/AMS triage protocol Comparison: 9/30/2024 Findings: The heart and pulmonary vasculature are within normal limits. Stable multiple bilateral noncalcified pulmonary nodules consistent with metastatic disease. No evidence of pneumothorax or large pleural effusion.     Impression: Impression: Stable exam. Multiple noncalcified pulmonary nodules consistent with metastatic disease. Electronically Signed: Romero Kellogg MD  12/26/2024 4:49 PM EST  Workstation ID: [id number]    CT Abdomen Pelvis With Contrast    Result Date: 12/3/2024  Narrative: CT ABDOMEN PELVIS W CONTRAST Date of Exam: 12/2/2024 8:53 AM EST Indication: staging. Comparison: Chest CT 9/30/2020 Technique: Axial CT images were obtained of the abdomen and pelvis after the uneventful intravenous administration of iodinated contrast. Reconstructed coronal and sagittal images were also obtained. Automated exposure control and iterative construction methods were used. Findings: Numerous lung nodules seen in the right middle lobe, left upper lobe, and both lower lobes. Index lesion on image 7 in the right lower lobe measures 1.8 cm similar to prior chest CT. There is a 2.3 cm lesion in the lingula. There is small pulmonary emboli in the left lower lobe pulmonary arteries. There is mild anasarca. There is no  pleural effusion. Mild to moderate ascites throughout the abdomen pelvis greater in the pelvis. There are numerous liver lesions including a lesion centrally on image 45 measuring 4.1 cm compatible with neoplasm. There is other smaller lesions seen including a 4.4 cm lesion on image 70 and a larger lesion measuring 6.7 cm on image 84 in the inferior right lobe. There is biliary dilation especially in the left liver likely due to biliary obstruction from tumor. There is thrombosis of the main portal vein extending into the right and left portal veins heterogeneous appearance of the liver is seen. There is cholelithiasis and mild splenomegaly. No definite pancreatic lesion. Numerous upper abdominal lymph nodes are seen in the retroperitoneum inferior to the adrenal glands. There is a lesion posterior to the IVC on image 76 measuring 2.2 cm and a lesion on image 81 lateral to the aorta measuring 2.4 cm. Other smaller lymph nodes are seen. There are areas of sharlene hepatis adenopathy some which likely necrotic. There is no obstructive uropathy uterus and ovaries appear normal. There is mild to moderate stool in colon. Appendix not well visualized are present. Stomach is normal. There is no small bowel finding. There is mild to moderate atherosclerosis including the origin of the SMA and renal arteries. No focal osseous lesion. There is degenerative change of the spine and hips.     Impression: Impression: Small pulmonary emboli seen in the visualized left lower lobe pulmonary arteries. 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 Recommend follow-up chest CT to evaluate extent of pulmonary emboli. Discussed with referring physician at time of dictation. Numerous bilateral lung nodules compatible with neoplasm the largest measuring up to 2.3 cm Numerous liver lesions including a 4 cm lesion centrally and a larger 6.7 cm  lesion in the inferior right lobe of the liver compatible with neoplasm. There is biliary dilation especially the left bile ducts compatible with biliary obstruction likely due to biliary obstruction from neoplasm. Mild to moderate abdominal and pelvic ascites. Cholelithiasis. Splenomegaly. Extensive upper abdominal metastatic adenopathy predominantly sharlene hepatis and retroperitoneum some which is necrotic compatible with neoplasm. Other findings as above. Electronically Signed: Ney Mejia MD  12/3/2024 11:08 AM EST  Workstation ID: [id number]    Complete PFT - Pre & Post Bronchodilator    Result Date: 12/2/2024  Narrative: Pulmonary function test  Spirometry: No obstructive lung defect noted.  FEV1 1.22 L / 60% predicted, FVC 1.29 L / 49% predicted.  Significant bronchodilator response was noted as both FEV1 and FVC improved by greater than 200 mL and 12% predicted.  Flow volume loop shows restrictive defect. Lung volumes: Moderate restrictive lung defect noted.  Total lung capacity is 3.82 L / 71% predicted.  ERV decreased consistent with obesity/poor body habitus.  No hyperinflation or air trapping.  Diffusion capacity: Diffusion capacity severely reduced at 35% predicted.  Overall impression: Moderate restrictive lung defect present.  Significant bronchodilator response was noted.  ERV was decreased which can be seen in obesity and poor body habitus.  Diffusion capacity severely reduced. Electronically signed by Nic Vila MD, 12/02/24, 6:58 AM EST.     EKG: (my review): Sinus rhythm, heart rate 85, QTc 454.  No ST elevation or depression    Assessment & Plan   Assessment / Plan     -I have discussed the case with the ED physician.  I have seen patient at bedside.  I have independently reviewed her EKG, labs, imaging, record    # Dizziness, likely from dehydration/poor p.o. 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  Pending infectious workups with blood cultures, urine cultures, lactic acid, and procalcitonin  - Giving fluids.  Patient appear dehydrated.  - Checking folate/B12.  Replete as needed.  - PT/OT    Resume home medications when appropriate.  DVT prophylaxis with Eliquis.    CODE STATUS:    Level Of Support Discussed With: Patient  Code Status (Patient has no pulse and is not breathing): CPR (Attempt to Resuscitate)  Medical Interventions (Patient has pulse or is breathing): Full Support      Admission Status:  I believe this patient meets admission status.    Electronically signed by Adrian Downs MD, 12/26/24, 11:18 PM EST.

## 2024-12-27 NOTE — ED NOTES
Provided pt with a bedside commode. Pt said she is unable to try and provide a urine sample at this time.

## 2024-12-28 LAB
ANION GAP SERPL CALCULATED.3IONS-SCNC: 14 MMOL/L (ref 5–15)
BACTERIA SPEC AEROBE CULT: NORMAL
BUN SERPL-MCNC: 32 MG/DL (ref 8–23)
BUN/CREAT SERPL: 31.4 (ref 7–25)
CALCIUM SPEC-SCNC: 8.1 MG/DL (ref 8.6–10.5)
CHLORIDE SERPL-SCNC: 103 MMOL/L (ref 98–107)
CO2 SERPL-SCNC: 18 MMOL/L (ref 22–29)
CREAT SERPL-MCNC: 1.02 MG/DL (ref 0.57–1)
DEPRECATED RDW RBC AUTO: 56.1 FL (ref 37–54)
EGFRCR SERPLBLD CKD-EPI 2021: 56.4 ML/MIN/1.73
ERYTHROCYTE [DISTWIDTH] IN BLOOD BY AUTOMATED COUNT: 16.9 % (ref 12.3–15.4)
GLUCOSE SERPL-MCNC: 101 MG/DL (ref 65–99)
HCT VFR BLD AUTO: 32.1 % (ref 34–46.6)
HGB BLD-MCNC: 10.3 G/DL (ref 12–15.9)
MAGNESIUM SERPL-MCNC: 2 MG/DL (ref 1.6–2.4)
MCH RBC QN AUTO: 29.1 PG (ref 26.6–33)
MCHC RBC AUTO-ENTMCNC: 32.1 G/DL (ref 31.5–35.7)
MCV RBC AUTO: 90.7 FL (ref 79–97)
PLATELET # BLD AUTO: 254 10*3/MM3 (ref 140–450)
PMV BLD AUTO: 9.2 FL (ref 6–12)
POTASSIUM SERPL-SCNC: 4.2 MMOL/L (ref 3.5–5.2)
RBC # BLD AUTO: 3.54 10*6/MM3 (ref 3.77–5.28)
SODIUM SERPL-SCNC: 135 MMOL/L (ref 136–145)
WBC NRBC COR # BLD AUTO: 10.29 10*3/MM3 (ref 3.4–10.8)

## 2024-12-28 PROCEDURE — 0DB68ZX EXCISION OF STOMACH, VIA NATURAL OR ARTIFICIAL OPENING ENDOSCOPIC, DIAGNOSTIC: ICD-10-PCS | Performed by: INTERNAL MEDICINE

## 2024-12-28 PROCEDURE — 94799 UNLISTED PULMONARY SVC/PX: CPT

## 2024-12-28 PROCEDURE — 25010000002 PROPOFOL 10 MG/ML EMULSION: Performed by: NURSE ANESTHETIST, CERTIFIED REGISTERED

## 2024-12-28 PROCEDURE — 25810000003 LACTATED RINGERS PER 1000 ML: Performed by: NURSE ANESTHETIST, CERTIFIED REGISTERED

## 2024-12-28 PROCEDURE — 25010000002 CEFTRIAXONE PER 250 MG: Performed by: INTERNAL MEDICINE

## 2024-12-28 PROCEDURE — 99233 SBSQ HOSP IP/OBS HIGH 50: CPT | Performed by: INTERNAL MEDICINE

## 2024-12-28 PROCEDURE — 88342 IMHCHEM/IMCYTCHM 1ST ANTB: CPT | Performed by: INTERNAL MEDICINE

## 2024-12-28 PROCEDURE — 43239 EGD BIOPSY SINGLE/MULTIPLE: CPT | Performed by: INTERNAL MEDICINE

## 2024-12-28 PROCEDURE — 25010000002 LIDOCAINE PF 2% 2 % SOLUTION: Performed by: NURSE ANESTHETIST, CERTIFIED REGISTERED

## 2024-12-28 PROCEDURE — 94761 N-INVAS EAR/PLS OXIMETRY MLT: CPT

## 2024-12-28 PROCEDURE — 85027 COMPLETE CBC AUTOMATED: CPT | Performed by: INTERNAL MEDICINE

## 2024-12-28 PROCEDURE — 80048 BASIC METABOLIC PNL TOTAL CA: CPT | Performed by: INTERNAL MEDICINE

## 2024-12-28 PROCEDURE — 83735 ASSAY OF MAGNESIUM: CPT | Performed by: INTERNAL MEDICINE

## 2024-12-28 PROCEDURE — 88305 TISSUE EXAM BY PATHOLOGIST: CPT | Performed by: INTERNAL MEDICINE

## 2024-12-28 RX ORDER — SODIUM CHLORIDE, SODIUM LACTATE, POTASSIUM CHLORIDE, CALCIUM CHLORIDE 600; 310; 30; 20 MG/100ML; MG/100ML; MG/100ML; MG/100ML
INJECTION, SOLUTION INTRAVENOUS CONTINUOUS PRN
Status: DISCONTINUED | OUTPATIENT
Start: 2024-12-28 | End: 2024-12-28 | Stop reason: SURG

## 2024-12-28 RX ORDER — PROPOFOL 10 MG/ML
VIAL (ML) INTRAVENOUS AS NEEDED
Status: DISCONTINUED | OUTPATIENT
Start: 2024-12-28 | End: 2024-12-28 | Stop reason: SURG

## 2024-12-28 RX ORDER — PANTOPRAZOLE SODIUM 40 MG/1
40 TABLET, DELAYED RELEASE ORAL
Status: DISCONTINUED | OUTPATIENT
Start: 2024-12-29 | End: 2024-12-30 | Stop reason: HOSPADM

## 2024-12-28 RX ORDER — LIDOCAINE HYDROCHLORIDE 20 MG/ML
INJECTION, SOLUTION EPIDURAL; INFILTRATION; INTRACAUDAL; PERINEURAL AS NEEDED
Status: DISCONTINUED | OUTPATIENT
Start: 2024-12-28 | End: 2024-12-28 | Stop reason: SURG

## 2024-12-28 RX ORDER — ACETAMINOPHEN 325 MG/1
650 TABLET ORAL EVERY 6 HOURS PRN
Status: DISCONTINUED | OUTPATIENT
Start: 2024-12-28 | End: 2024-12-30 | Stop reason: HOSPADM

## 2024-12-28 RX ADMIN — PANTOPRAZOLE SODIUM 40 MG: 40 INJECTION, POWDER, FOR SOLUTION INTRAVENOUS at 09:29

## 2024-12-28 RX ADMIN — PROPOFOL 30 MG: 10 INJECTION, EMULSION INTRAVENOUS at 08:16

## 2024-12-28 RX ADMIN — CARVEDILOL 6.25 MG: 6.25 TABLET, FILM COATED ORAL at 09:29

## 2024-12-28 RX ADMIN — ACETAMINOPHEN 650 MG: 325 TABLET ORAL at 09:29

## 2024-12-28 RX ADMIN — Medication 10 ML: at 21:54

## 2024-12-28 RX ADMIN — PROPOFOL 30 MG: 10 INJECTION, EMULSION INTRAVENOUS at 08:14

## 2024-12-28 RX ADMIN — SODIUM CHLORIDE 1000 MG: 9 INJECTION INTRAMUSCULAR; INTRAVENOUS; SUBCUTANEOUS at 21:54

## 2024-12-28 RX ADMIN — PROPOFOL 200 MCG/KG/MIN: 10 INJECTION, EMULSION INTRAVENOUS at 08:14

## 2024-12-28 RX ADMIN — ACETAMINOPHEN 650 MG: 325 TABLET ORAL at 17:01

## 2024-12-28 RX ADMIN — SODIUM CHLORIDE, POTASSIUM CHLORIDE, SODIUM LACTATE AND CALCIUM CHLORIDE: 600; 310; 30; 20 INJECTION, SOLUTION INTRAVENOUS at 08:11

## 2024-12-28 RX ADMIN — CITALOPRAM HYDROBROMIDE 40 MG: 20 TABLET ORAL at 09:29

## 2024-12-28 RX ADMIN — LIDOCAINE HYDROCHLORIDE 50 MG: 20 INJECTION, SOLUTION INTRAVENOUS at 08:14

## 2024-12-28 RX ADMIN — CARVEDILOL 6.25 MG: 6.25 TABLET, FILM COATED ORAL at 17:01

## 2024-12-28 RX ADMIN — Medication 10 ML: at 09:30

## 2024-12-28 NOTE — INTERVAL H&P NOTE
Pt discussed EGD further with her family and wishes to proceed.  Risks and benefits d/w pt and she wishes to proceed.  H&P reviewed. The patient was examined and there are no changes to the H&P.

## 2024-12-28 NOTE — PLAN OF CARE
Goal Outcome Evaluation:  Plan of Care Reviewed With: patient        Progress: improving  Outcome Evaluation: Patient went for EGD today; biopsies were taken, no signs of bleeding or a tumor. Patient's daughter in law updated with findings of procedure via telephone. Patient remained on RA today. Complaints of pain x2; medicated effectively with PRN tylenol.

## 2024-12-28 NOTE — PROGRESS NOTES
[facility]   Hospitalist Progress Note  Date: 2024  Patient Name: Manuelito Stratton  : 1945  MRN: [id number]  Date of admission: 2024  Consultants:   -Gastroenterology: Dr. Macario Lane    Subjective   Subjective     Chief Complaint: Dizziness    Summary:   Manuelito Stratton is a 79 y.o. 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  Eval in ED significant for urinalysis consistent with UTI and orthostatic vital signs were noted to be positive.  Labs did show anemia.  Hospitalist service contacted for further evaluation and management.  Empiric antibiotic for UTI started.  Patient did endorse melena and frequent/daily use of ibuprofen.  Open Gastroenterology consulted and patient underwent EGD on 2024.  Findings included esophagitis and duodenitis, no bleeding seen.    Interval Followup:   No acute issues overnight.  Patient seen and examined after EGD.  Hemoglobin improved on a.m. labs.  Patient had send the past that she does not wish to pursue any further workup or intervention for her cancer.  Denies chest pain or short of breath.  Nursing with no additional acute issues to report.    Procedures:  -EGD (2024)    Antibiotics:   -Ceftriaxone    Objective   Objective     Vitals:   Temp:  [97.3 °F (36.3 °C)-99.2 °F (37.3 °C)] 97.8 °F (36.6 °C)  Heart Rate:  [76-98] 80  Resp:  [14-20] 18  BP: (111-176)/(60-92) 127/69  Flow (L/min) (Oxygen Therapy):  [2-4] 2  FiO2 (%):  [55 %] 55 %  Physical Exam   Gen: No acute distress, sitting up in bed, conversant  Resp: CTAB, No w/r/r, normal respiratory effort  Card: RRR, No m/r/g  Abd: Soft, Nontender, Nondistended, + bowel sounds    Result Review    Result Review:  I have personally reviewed the results as below and agree with these findings:  []  Laboratory:   CMP          2024    15:57 2024    04:24  12/28/2024    05:05   CMP   Glucose 112  100  101    BUN 32  30  32    Creatinine 1.10  1.05  1.02    EGFR 51.5  54.5  56.4    Sodium 138  137  135    Potassium 4.4  4.2  4.2    Chloride 108  107  103    Calcium 8.3  8.1  8.1    Total Protein 6.3  5.5     Albumin 2.8  2.4     Globulin 3.5  3.1     Total Bilirubin 0.4  0.2     Alkaline Phosphatase 322  299     AST (SGOT) 25  23     ALT (SGPT) 12  11     Albumin/Globulin Ratio 0.8  0.8     BUN/Creatinine Ratio 29.1  28.6  31.4    Anion Gap 11.0  9.7  14.0      CBC          12/26/2024    15:57 12/27/2024    04:24 12/28/2024    05:05   CBC   WBC 14.87  10.82  10.29    RBC 2.75  2.40  3.54    Hemoglobin 8.2  7.0  10.3    Hematocrit 28.5  23.4  32.1    .6  97.5  90.7    MCH 29.8  29.2  29.1    MCHC 28.8  29.9  32.1    RDW 18.3  18.2  16.9    Platelets 353  319  254    Magnesium within normal limits  [x]  Microbiology: Urine culture (12/26/2024): No growth to date.  Blood culture (12/26/2024): No growth to date.  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  -IV iron infusion ordered  -Pantoprazole 40 mg daily  -Monitor hemoglobin  -Continue ceftriaxone.  Tailor based on results of infectious workup  -Continue holding home apixaban  -Continue appropriate home medications  -Monitor electrolytes and renal function with BMP and magnesium level in the AM  -Monitor WBC and Hgb with CBC in the AM  -Clinical course will dictate further management     DVT Prophylaxis: SCDs  GI  Prophylaxis: Pantoprazole  Diet:   Diet Order   Procedures    Diet: Regular/House; Fluid Consistency: Thin (IDDSI 0)     Dispo: Home when medically appropriate for discharge     Time spent personally reviewing patient's chart, labs and imaging, evaluating/examining the patient, discussing care plan with patient and nurse at bedside and discussing management with consultants (Gastroenterology): 51 minutes.     Part of this note may be an electronic transcription/translation of spoken language to printed text using the Dragon dictation system.    VTE Prophylaxis:  Pharmacologic & mechanical VTE prophylaxis orders are present.        CODE STATUS:   Level Of Support Discussed With: Patient  Code Status (Patient has no pulse and is not breathing): CPR (Attempt to Resuscitate)  Medical Interventions (Patient has pulse or is breathing): Full Support        Electronically signed by Hi Boo MD, 12/28/2024, 14:46 EST.

## 2024-12-28 NOTE — PLAN OF CARE
Goal Outcome Evaluation:  Plan of Care Reviewed With: patient        Progress: no change  Outcome Evaluation: PT is AAOx4, VSS, no c/o of SOA, N/V/D, received 2U PRBC with no reaction, CBC pending at time of this note, no s/s of rectal bleeding or hematemesis, tolerating IV Rocephin for UTI, NPO since midnight for EGD today, standby assist to BR, no acute events this shift, bed in low/locked position, alarm audible, call light within reach, continue with current POC at this time.

## 2024-12-28 NOTE — ANESTHESIA POSTPROCEDURE EVALUATION
Patient: Manuelito Stratton    Procedure Summary       Date: 12/28/24 Room / Location: [facility] ENDOSCOPY 3 / [facility] ENDOSCOPY    Anesthesia Start: 0811 Anesthesia Stop: 0829    Procedure: ESOPHAGOGASTRODUODENOSCOPY WITH BIOPSIES Diagnosis:       Iron deficiency anemia      (Iron deficiency anemia [D50.9])    Surgeons: Macario Lane MD Provider: Claudia Harmon CRNA    Anesthesia Type: general ASA Status: 3            Anesthesia Type: general    Vitals  Vitals Value Taken Time   /64 12/28/24 0830   Temp     Pulse 79 12/28/24 0830   Resp 14 12/28/24 0830   SpO2 94 % 12/28/24 0830           Post Anesthesia Care and Evaluation    Post-procedure mental status: acceptable.  Pain management: satisfactory to patient    Airway patency: patent  Anesthetic complications: No anesthetic complications    Cardiovascular status: acceptable  Respiratory status: acceptable    Comments: Per chart review

## 2024-12-29 LAB
ANION GAP SERPL CALCULATED.3IONS-SCNC: 9 MMOL/L (ref 5–15)
BH BB BLOOD EXPIRATION DATE: NORMAL
BH BB BLOOD EXPIRATION DATE: NORMAL
BH BB BLOOD TYPE BARCODE: 5100
BH BB BLOOD TYPE BARCODE: 5100
BH BB DISPENSE STATUS: NORMAL
BH BB DISPENSE STATUS: NORMAL
BH BB PRODUCT CODE: NORMAL
BH BB PRODUCT CODE: NORMAL
BH BB UNIT NUMBER: NORMAL
BH BB UNIT NUMBER: NORMAL
BUN SERPL-MCNC: 31 MG/DL (ref 8–23)
BUN/CREAT SERPL: 28.4 (ref 7–25)
CALCIUM SPEC-SCNC: 8.2 MG/DL (ref 8.6–10.5)
CHLORIDE SERPL-SCNC: 107 MMOL/L (ref 98–107)
CO2 SERPL-SCNC: 20 MMOL/L (ref 22–29)
CREAT SERPL-MCNC: 1.09 MG/DL (ref 0.57–1)
CROSSMATCH INTERPRETATION: NORMAL
CROSSMATCH INTERPRETATION: NORMAL
DEPRECATED RDW RBC AUTO: 57.4 FL (ref 37–54)
EGFRCR SERPLBLD CKD-EPI 2021: 52.1 ML/MIN/1.73
ERYTHROCYTE [DISTWIDTH] IN BLOOD BY AUTOMATED COUNT: 17.1 % (ref 12.3–15.4)
GLUCOSE SERPL-MCNC: 101 MG/DL (ref 65–99)
HCT VFR BLD AUTO: 32.6 % (ref 34–46.6)
HGB BLD-MCNC: 10.2 G/DL (ref 12–15.9)
MAGNESIUM SERPL-MCNC: 2 MG/DL (ref 1.6–2.4)
MCH RBC QN AUTO: 29.2 PG (ref 26.6–33)
MCHC RBC AUTO-ENTMCNC: 31.3 G/DL (ref 31.5–35.7)
MCV RBC AUTO: 93.4 FL (ref 79–97)
PLATELET # BLD AUTO: 223 10*3/MM3 (ref 140–450)
PMV BLD AUTO: 8.9 FL (ref 6–12)
POTASSIUM SERPL-SCNC: 4.2 MMOL/L (ref 3.5–5.2)
RBC # BLD AUTO: 3.49 10*6/MM3 (ref 3.77–5.28)
SODIUM SERPL-SCNC: 136 MMOL/L (ref 136–145)
UNIT  ABO: NORMAL
UNIT  ABO: NORMAL
UNIT  RH: NORMAL
UNIT  RH: NORMAL
WBC NRBC COR # BLD AUTO: 11.2 10*3/MM3 (ref 3.4–10.8)

## 2024-12-29 PROCEDURE — 99232 SBSQ HOSP IP/OBS MODERATE 35: CPT | Performed by: INTERNAL MEDICINE

## 2024-12-29 PROCEDURE — 83735 ASSAY OF MAGNESIUM: CPT | Performed by: INTERNAL MEDICINE

## 2024-12-29 PROCEDURE — 85027 COMPLETE CBC AUTOMATED: CPT | Performed by: INTERNAL MEDICINE

## 2024-12-29 PROCEDURE — 25010000002 CEFTRIAXONE PER 250 MG: Performed by: INTERNAL MEDICINE

## 2024-12-29 PROCEDURE — 94761 N-INVAS EAR/PLS OXIMETRY MLT: CPT

## 2024-12-29 PROCEDURE — 94799 UNLISTED PULMONARY SVC/PX: CPT

## 2024-12-29 PROCEDURE — 80048 BASIC METABOLIC PNL TOTAL CA: CPT | Performed by: INTERNAL MEDICINE

## 2024-12-29 RX ORDER — OXYCODONE HYDROCHLORIDE 5 MG/1
10 TABLET ORAL EVERY 6 HOURS PRN
Status: DISCONTINUED | OUTPATIENT
Start: 2024-12-29 | End: 2024-12-30 | Stop reason: HOSPADM

## 2024-12-29 RX ORDER — FERROUS SULFATE 325(65) MG
325 TABLET ORAL
Status: DISCONTINUED | OUTPATIENT
Start: 2024-12-29 | End: 2024-12-30 | Stop reason: HOSPADM

## 2024-12-29 RX ORDER — OXYCODONE HYDROCHLORIDE 5 MG/1
5 TABLET ORAL EVERY 6 HOURS PRN
Status: DISCONTINUED | OUTPATIENT
Start: 2024-12-29 | End: 2024-12-30 | Stop reason: HOSPADM

## 2024-12-29 RX ORDER — NALOXONE HCL 0.4 MG/ML
0.4 VIAL (ML) INJECTION
Status: DISCONTINUED | OUTPATIENT
Start: 2024-12-29 | End: 2024-12-30 | Stop reason: HOSPADM

## 2024-12-29 RX ORDER — ONDANSETRON 4 MG/1
4 TABLET, ORALLY DISINTEGRATING ORAL EVERY 6 HOURS PRN
Status: DISCONTINUED | OUTPATIENT
Start: 2024-12-29 | End: 2024-12-30 | Stop reason: HOSPADM

## 2024-12-29 RX ADMIN — OXYCODONE 10 MG: 5 TABLET ORAL at 16:52

## 2024-12-29 RX ADMIN — ACETAMINOPHEN 650 MG: 325 TABLET ORAL at 13:05

## 2024-12-29 RX ADMIN — PANTOPRAZOLE SODIUM 40 MG: 40 TABLET, DELAYED RELEASE ORAL at 05:01

## 2024-12-29 RX ADMIN — Medication 10 ML: at 20:20

## 2024-12-29 RX ADMIN — CARVEDILOL 6.25 MG: 6.25 TABLET, FILM COATED ORAL at 17:53

## 2024-12-29 RX ADMIN — ACETAMINOPHEN 650 MG: 325 TABLET ORAL at 04:53

## 2024-12-29 RX ADMIN — CITALOPRAM HYDROBROMIDE 40 MG: 20 TABLET ORAL at 08:06

## 2024-12-29 RX ADMIN — OXYCODONE 10 MG: 5 TABLET ORAL at 23:52

## 2024-12-29 RX ADMIN — Medication 10 ML: at 08:06

## 2024-12-29 RX ADMIN — SODIUM CHLORIDE 1000 MG: 9 INJECTION INTRAMUSCULAR; INTRAVENOUS; SUBCUTANEOUS at 20:20

## 2024-12-29 RX ADMIN — FERROUS SULFATE TAB 325 MG (65 MG ELEMENTAL FE) 325 MG: 325 (65 FE) TAB at 14:35

## 2024-12-29 RX ADMIN — CARVEDILOL 6.25 MG: 6.25 TABLET, FILM COATED ORAL at 08:06

## 2024-12-29 NOTE — PLAN OF CARE
Goal Outcome Evaluation:  Plan of Care Reviewed With: patient        Progress: improving  Outcome Evaluation: Patient with complaints of back pain r/t [facility]. Tylenol given x2; orders for roxicodone obtained from Dr. Boo. Patient reports effectiveness with Angela. Remains on RA. Palliative consult placed.

## 2024-12-29 NOTE — PLAN OF CARE
Goal Outcome Evaluation:      Pt alert and oriented. No complaints of pain. Pt code status changed to DNR/DNI per pt request. Pt wanting to speak to someone with hospice to get more information about possibly signing up for it. Order placed. Pt has rested most of the night.

## 2024-12-29 NOTE — NURSING NOTE
Hospice consult changed to Palliative consult d/t patient wanting information on all options not just Hospice.

## 2024-12-29 NOTE — PROGRESS NOTES
[facility]   Hospitalist Progress Note  Date: 2024  Patient Name: Manuelito Stratton  : 1945  MRN: [id number]  Date of admission: 2024  Consultants:   -Gastroenterology: Dr. Macario Lane    Subjective   Subjective     Chief Complaint: Dizziness    Summary:   Manuelito Stratton is a 79 y.o. 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  Eval in ED significant for urinalysis consistent with UTI and orthostatic vital signs were noted to be positive.  Labs did show anemia.  Hospitalist service contacted for further evaluation and management.  Empiric antibiotic for UTI started.  Patient did endorse melena and frequent/daily use of ibuprofen.  Open Gastroenterology consulted and patient underwent EGD on 2024.  Findings included esophagitis and duodenitis, no bleeding seen.    Interval Followup:   No acute events overnight.  Hemoglobin stable on a.m. labs.  Patient denied any chest pain or shortness of breath.  Patient interested in discussing care options with palliative care.  Nursing with no additional acute issues to report.    Procedures:  -EGD (2024)    Antibiotics:   -Ceftriaxone    Objective   Objective     Vitals:   Temp:  [97 °F (36.1 °C)-98.2 °F (36.8 °C)] 98.1 °F (36.7 °C)  Heart Rate:  [75-80] 75  Resp:  [18] 18  BP: (134-150)/(72-82) 142/72  Physical Exam   Gen: No acute distress, conversant, sitting up in bed  Resp: CTAB, No w/r/r, equal chest rise bilaterally  Card: RRR, No m/r/g  Abd: Soft, Nontender, Nondistended, + bowel sounds    Result Review    Result Review:  I have personally reviewed the results as below and agree with these findings:  []  Laboratory:   CMP          2024    04:24 2024    05:05 2024    07:03   CMP   Glucose 100  101  101    BUN 30  32  31    Creatinine 1.05  1.02  1.09    EGFR 54.5  56.4  52.1    Sodium  137  135  136    Potassium 4.2  4.2  4.2    Chloride 107  103  107    Calcium 8.1  8.1  8.2    Total Protein 5.5      Albumin 2.4      Globulin 3.1      Total Bilirubin 0.2      Alkaline Phosphatase 299      AST (SGOT) 23      ALT (SGPT) 11      Albumin/Globulin Ratio 0.8      BUN/Creatinine Ratio 28.6  31.4  28.4    Anion Gap 9.7  14.0  9.0      CBC          12/27/2024    04:24 12/28/2024    05:05 12/29/2024    07:03   CBC   WBC 10.82  10.29  11.20    RBC 2.40  3.54  3.49    Hemoglobin 7.0  10.3  10.2    Hematocrit 23.4  32.1  32.6    MCV 97.5  90.7  93.4    MCH 29.2  29.1  29.2    MCHC 29.9  32.1  31.3    RDW 18.2  16.9  17.1    Platelets 319  254  223    Magnesium within normal limits  [x]  Microbiology: Urine culture (12/26/2024): Mixed dariusz.  Blood culture (12/26/2024): No growth to date.  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  -Start oral iron  -Pantoprazole 40 mg daily  -Monitor hemoglobin  -Continue ceftriaxone.  Treat for total of 3 days  -Continue holding home apixaban  -Continue appropriate home medications  -Palliative care consulted  -Will monitor electrolytes and renal function with BMP and magnesium level in the AM  -Will monitor WBC and Hgb with CBC in the AM  -Clinical course will dictate further management     DVT Prophylaxis: SCDs  GI Prophylaxis: Pantoprazole  Diet:   Diet Order   Procedures    Diet: Regular/House; Fluid Consistency: Thin (IDDSI 0)     Dispo: Home when medically appropriate for  discharge     Personally reviewed patients labs and imaging, discussed with patient and nurse at bedside. Discussed management with the following consultants: Gastroenterology.     Part of this note may be an electronic transcription/translation of spoken language to printed text using the Dragon dictation system.    VTE Prophylaxis:  Pharmacologic & mechanical VTE prophylaxis orders are present.        CODE STATUS:   Medical Intervention Limits: No intubation (DNI)  Code Status (Patient has no pulse and is not breathing): No CPR (Do Not Attempt to Resuscitate)  Medical Interventions (Patient has pulse or is breathing): Limited Support        Electronically signed by Hi Boo MD, 12/29/2024, 12:58 EST.

## 2024-12-29 NOTE — NURSING NOTE
"Pt daughter in law stopped nurse and asked how the patient could get signed up with hospice. Daughter in law told nurse that the patient didn't want any further tests and knew there was not much that could be done for her. Nurse spoke to pt directly and patient told nurse that the \"family\" is wanting her to go ahead and sign up with hospice but that she didn't know if she was ready or not for that. Nurse asked pt about her code status explaining that she currently was a full code and that if her heart stopped or if she quit breathing that cpr would be performed. Pt reports to nurse that she does not want CPR and is ready to be a DNR. Nurse also explained that hospice was her decision and not the family's. But that I could put in a consult and someone from hospice would come and educate her on it and what all it entails. She could make an informed decision. Pt stated to nurse that she would like to talk to someone about hospice, but not sure if she would want to sign up until she spoke with them. MD was notified and DNR status obtained. Order placed for hospice consult.   "

## 2024-12-30 ENCOUNTER — READMISSION MANAGEMENT (OUTPATIENT)
Dept: CALL CENTER | Facility: HOSPITAL | Age: 79
End: 2024-12-30
Payer: MEDICARE

## 2024-12-30 VITALS
TEMPERATURE: 97.5 F | RESPIRATION RATE: 18 BRPM | HEIGHT: 66 IN | BODY MASS INDEX: 21.4 KG/M2 | DIASTOLIC BLOOD PRESSURE: 68 MMHG | OXYGEN SATURATION: 98 % | WEIGHT: 133.16 LBS | SYSTOLIC BLOOD PRESSURE: 146 MMHG | HEART RATE: 82 BPM

## 2024-12-30 LAB
ANION GAP SERPL CALCULATED.3IONS-SCNC: 9.4 MMOL/L (ref 5–15)
BUN SERPL-MCNC: 32 MG/DL (ref 8–23)
BUN/CREAT SERPL: 27.8 (ref 7–25)
CALCIUM SPEC-SCNC: 8.1 MG/DL (ref 8.6–10.5)
CHLORIDE SERPL-SCNC: 108 MMOL/L (ref 98–107)
CO2 SERPL-SCNC: 20.6 MMOL/L (ref 22–29)
CREAT SERPL-MCNC: 1.15 MG/DL (ref 0.57–1)
DEPRECATED RDW RBC AUTO: 58.2 FL (ref 37–54)
EGFRCR SERPLBLD CKD-EPI 2021: 48.9 ML/MIN/1.73
ERYTHROCYTE [DISTWIDTH] IN BLOOD BY AUTOMATED COUNT: 16.8 % (ref 12.3–15.4)
GLUCOSE SERPL-MCNC: 92 MG/DL (ref 65–99)
HCT VFR BLD AUTO: 30.3 % (ref 34–46.6)
HGB BLD-MCNC: 9.6 G/DL (ref 12–15.9)
MAGNESIUM SERPL-MCNC: 2 MG/DL (ref 1.6–2.4)
MCH RBC QN AUTO: 30 PG (ref 26.6–33)
MCHC RBC AUTO-ENTMCNC: 31.7 G/DL (ref 31.5–35.7)
MCV RBC AUTO: 94.7 FL (ref 79–97)
PLATELET # BLD AUTO: 223 10*3/MM3 (ref 140–450)
PMV BLD AUTO: 9 FL (ref 6–12)
POTASSIUM SERPL-SCNC: 3.8 MMOL/L (ref 3.5–5.2)
RBC # BLD AUTO: 3.2 10*6/MM3 (ref 3.77–5.28)
SODIUM SERPL-SCNC: 138 MMOL/L (ref 136–145)
WBC NRBC COR # BLD AUTO: 8.69 10*3/MM3 (ref 3.4–10.8)

## 2024-12-30 PROCEDURE — 85027 COMPLETE CBC AUTOMATED: CPT | Performed by: INTERNAL MEDICINE

## 2024-12-30 PROCEDURE — 80048 BASIC METABOLIC PNL TOTAL CA: CPT | Performed by: INTERNAL MEDICINE

## 2024-12-30 PROCEDURE — 94799 UNLISTED PULMONARY SVC/PX: CPT

## 2024-12-30 PROCEDURE — 99239 HOSP IP/OBS DSCHRG MGMT >30: CPT | Performed by: INTERNAL MEDICINE

## 2024-12-30 PROCEDURE — 83735 ASSAY OF MAGNESIUM: CPT | Performed by: INTERNAL MEDICINE

## 2024-12-30 RX ORDER — PANTOPRAZOLE SODIUM 40 MG/1
40 TABLET, DELAYED RELEASE ORAL
Qty: 30 TABLET | Refills: 0 | Status: SHIPPED | OUTPATIENT
Start: 2024-12-31 | End: 2025-01-30

## 2024-12-30 RX ORDER — CARVEDILOL 6.25 MG/1
6.25 TABLET ORAL 2 TIMES DAILY WITH MEALS
Qty: 60 TABLET | Refills: 0 | Status: SHIPPED | OUTPATIENT
Start: 2024-12-30 | End: 2025-01-29

## 2024-12-30 RX ORDER — PANTOPRAZOLE SODIUM 40 MG/1
40 TABLET, DELAYED RELEASE ORAL
Qty: 30 TABLET | Refills: 0 | Status: SHIPPED | OUTPATIENT
Start: 2024-12-31 | End: 2024-12-30

## 2024-12-30 RX ORDER — OXYCODONE HYDROCHLORIDE 10 MG/1
10 TABLET ORAL EVERY 6 HOURS PRN
Qty: 12 TABLET | Refills: 0 | Status: SHIPPED | OUTPATIENT
Start: 2024-12-30 | End: 2025-01-02

## 2024-12-30 RX ORDER — OXYCODONE HYDROCHLORIDE 10 MG/1
10 TABLET ORAL EVERY 6 HOURS PRN
Qty: 12 TABLET | Refills: 0 | Status: SHIPPED | OUTPATIENT
Start: 2024-12-30 | End: 2024-12-30

## 2024-12-30 RX ADMIN — FERROUS SULFATE TAB 325 MG (65 MG ELEMENTAL FE) 325 MG: 325 (65 FE) TAB at 10:00

## 2024-12-30 RX ADMIN — CITALOPRAM HYDROBROMIDE 40 MG: 20 TABLET ORAL at 10:00

## 2024-12-30 RX ADMIN — PANTOPRAZOLE SODIUM 40 MG: 40 TABLET, DELAYED RELEASE ORAL at 06:20

## 2024-12-30 RX ADMIN — CARVEDILOL 6.25 MG: 6.25 TABLET, FILM COATED ORAL at 10:00

## 2024-12-30 NOTE — SIGNIFICANT NOTE
12/30/24 1001   Physical Therapy Time and Intention   Session Not Performed   (Pt has orders for discharge today.)

## 2024-12-30 NOTE — PLAN OF CARE
Goal Outcome Evaluation:         Pt discharging with daughter in law to assisted living.     Rema Vaughn RN'

## 2024-12-30 NOTE — PLAN OF CARE
Goal Outcome Evaluation:  Plan of Care Reviewed With: patient        Progress: improving  Outcome Evaluation: Alert and oriented times 4, medicated for pain times 2 this shift, VSS, continue plan of care

## 2024-12-30 NOTE — DISCHARGE SUMMARY
[facility]         HOSPITALIST  DISCHARGE SUMMARY    Patient Name: Manuelito Stratton  : 1945  MRN: [id number]    Date of Admission: 2024  Date of Discharge:  24  Primary Care Physician: Mauro Cross MD    Consultants:  -Gastroenterology: Dr. Macario Lane     [facility] Problems:  Urinary tract infection due to unknown infectious organism  Iron deficiency anemia  Concern for GI bleed  Dizziness  Suspected stage IV non-small cell lung cancer  Essential hypertension  Depression  History of PE and portal vein thrombus on [facility]    Hospital Course     Hospital Course:  Manuelito Stratton is a 79 y.o. 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 Eval in ED significant for urinalysis consistent with UTI and orthostatic vital signs were noted to be positive. Labs did show anemia. Hospitalist service contacted for further evaluation and management. Empiric antibiotic for UTI started. Patient did endorse melena and frequent/daily use of ibuprofen. Gastroenterology consulted and patient underwent EGD on 2024. Findings included esophagitis and duodenitis, no bleeding seen.  After additional discussions with the patient that she confirmed that she did not wish to pursue any additional workup or evaluation for her metastatic non-small cell lung cancer.  Given stage IV non-small cell lung cancer and prognosis patient is felt to be hospice appropriate.  Patient will be discharging to assisted living facility and hospice services are to meet with her and evaluate her there.  Patient hemodynamically stable and no additional inpatient evaluation or workup necessary at this time.    DISCHARGE Follow Up Recommendations for labs and diagnostics:   -Follow-up with PCP 3 to 5 days    Day of Discharge     Vital Signs:  Temp:  [97.5 °F (36.4 °C)-98.1 °F (36.7 °C)]  97.5 °F (36.4 °C)  Heart Rate:  [69-82] 82  Resp:  [18] 18  BP: (116-160)/(61-81) 146/68  Physical Exam:   Gen: No acute distress, sitting up in bed, pleasant, conversant  Resp: CTAB, No w/r/r, normal respiratory effort  Card: RRR, No m/r/g  Abd: Soft, Nontender, Nondistended, + bowel sounds     Discharge Details        Discharge Medications        New Medications        Instructions Start Date   oxyCODONE 10 MG tablet  Commonly known as: ROXICODONE   10 mg, Oral, Every 6 Hours PRN      pantoprazole 40 MG EC tablet  Commonly known as: PROTONIX   40 mg, Oral, Every Early Morning   Start Date: December 31, 2024            Continue These Medications        Instructions Start Date   apixaban 5 MG tablet tablet  Commonly known as: ELIQUIS   5 mg, Oral, 2 Times Daily      carvedilol 12.5 MG tablet  Commonly known as: COREG   1 tablet, Oral, Every 12 Hours Scheduled      carvedilol 6.25 MG tablet  Commonly known as: COREG   6.25 mg, Oral, 2 Times Daily With Meals      citalopram 40 MG tablet  Commonly known as: CeleXA   1 tablet, Oral, Daily      ferrous gluconate 324 MG tablet  Commonly known as: FERGON   1 tablet, Oral, Every 12 Hours Scheduled               No Known Allergies    Discharge Disposition:  Home or Self Care    Diet:  Hospital:  Diet Order   Procedures    Diet: Regular/House; Fluid Consistency: Thin (IDDSI 0)       Discharge Activity:   Activity Instructions       Activity as Tolerated              CODE STATUS:  Code Status and Medical Interventions: No CPR (Do Not Attempt to Resuscitate); Limited Support; No intubation (DNI)   Ordered at: 12/28/24 2127     Medical Intervention Limits:    No intubation (DNI)     Code Status (Patient has no pulse and is not breathing):    No CPR (Do Not Attempt to Resuscitate)     Medical Interventions (Patient has pulse or is breathing):    Limited Support       Future Appointments   Date Time Provider Department Center   1/7/2025  1:00 PM Abiola Greene APRN [facility]    1/14/2025  4:00 PM MYAH ALONDRA CT 1  [facility] ETWCT [facility]   1/29/2025  3:15 PM Best Valentin MD [facility] ONC [facility]WN [facility]   3/4/2025 11:00 AM Alisson Chisholm APRN [facility] PCC ETW [facility]       Additional Instructions for the Follow-ups that You Need to Schedule       Discharge Follow-up with PCP   As directed       Currently Documented PCP:    Mauro Cross MD    PCP Phone Number:    [phone]     Follow Up Details: Follow-up in 3 to 5 days                Pertinent  and/or Most Recent Results     PROCEDURES:   -EGD (12/20/2024)     RADIOLOGY:  CT Head Without Contrast [[id number]] Brandyn as Reviewed   Order Status: Completed Collected: 12/26/24 2240    Updated: 12/26/24 2248   Narrative:     CT HEAD WO CONTRAST    HISTORY:  Dizziness. History of lung cancer.    TECHNIQUE:  Axial unenhanced head CT with multiplanar reformats. Radiation dose reduction techniques included automated exposure control or exposure modulation based on body size.    COMPARISON:  Brain MRI 10/22/2024, CT head 12/10/2022    FINDINGS:  Ventricular size and configuration are normal. No acute infarct or hemorrhage is identified. There are no masses. There is no skull fracture. There is atrophy with chronic small vessel ischemic disease in the white matter. Atherosclerotic calcifications  are noted in the vertebral arteries and carotid siphons. There is a chronic mucous retention cyst in the right maxillary sinus. Mastoid air cells and middle ear cavities are clear. There is probably cerumen in the left external auditory canal.   Impression:     1.No acute intracranial abnormality identified.  2.Atrophy and chronic small vessel ischemic disease.        Electronically Signed: Jayson Rudolph MD   12/26/2024 10:46 PM EST   Workstation ID: [id number]    XR Chest 1 View [[id number]] Brandyn as Reviewed   Order Status: Completed Collected: 12/26/24 1645    Updated: 12/26/24 1651   Narrative:     XR CHEST 1 VW    Date of Exam: 12/26/2024 4:22 PM EST    Indication:  Weak/Dizzy/AMS triage protocol    Comparison: 9/30/2024    Findings:  The heart and pulmonary vasculature are within normal limits. Stable multiple bilateral noncalcified pulmonary nodules consistent with metastatic disease. No evidence of pneumothorax or large pleural effusion.   Impression:     Impression:  Stable exam. Multiple noncalcified pulmonary nodules consistent with metastatic disease.      Electronically Signed: Romero Kellogg MD   12/26/2024 4:49 PM EST   Workstation ID: [id number]     LAB RESULTS:      Lab 12/30/24  0457 12/29/24  0703 12/28/24  0505 12/27/24  0424 12/26/24 2001 12/26/24  1557   WBC 8.69 11.20* 10.29 10.82*  --  14.87*   HEMOGLOBIN 9.6* 10.2* 10.3* 7.0*  --  8.2*   HEMATOCRIT 30.3* 32.6* 32.1* 23.4*  --  28.5*   PLATELETS 223 223 254 319  --  353   NEUTROS ABS  --   --   --   --   --  12.43*   IMMATURE GRANS (ABS)  --   --   --   --   --  0.07*   LYMPHS ABS  --   --   --   --   --  1.09   MONOS ABS  --   --   --   --   --  1.16*   EOS ABS  --   --   --   --   --  0.07   MCV 94.7 93.4 90.7 97.5*  --  103.6*   PROCALCITONIN  --   --   --   --  0.16  --    LACTATE  --   --   --  1.2  --   --          Lab 12/30/24  0457 12/29/24  0703 12/28/24  0505 12/27/24  0424 12/26/24  1557   SODIUM 138 136 135* 137 138   POTASSIUM 3.8 4.2 4.2 4.2 4.4   CHLORIDE 108* 107 103 107 108*   CO2 20.6* 20.0* 18.0* 20.3* 19.0*   ANION GAP 9.4 9.0 14.0 9.7 11.0   BUN 32* 31* 32* 30* 32*   CREATININE 1.15* 1.09* 1.02* 1.05* 1.10*   EGFR 48.9* 52.1* 56.4* 54.5* 51.5*   GLUCOSE 92 101* 101* 100* 112*   CALCIUM 8.1* 8.2* 8.1* 8.1* 8.3*   MAGNESIUM 2.0 2.0 2.0 2.1 2.2   PHOSPHORUS  --   --   --  3.4  --          Lab 12/27/24 0424 12/26/24  1557   TOTAL PROTEIN 5.5* 6.3   ALBUMIN 2.4* 2.8*   GLOBULIN 3.1 3.5   ALT (SGPT) 11 12   AST (SGOT) 23 25   BILIRUBIN 0.2 0.4   ALK PHOS 299* 322*         Lab 12/26/24 2001 12/26/24  1557   HSTROP T 33* 33*             Lab 12/27/24  1331 12/27/24 0424 12/27/24 0424  12/26/24  1557   IRON  --   --  26*  --    IRON SATURATION (TSAT)  --   --  11*  --    TIBC  --   --  235*  --    TRANSFERRIN  --   --  158*  --    FERRITIN  --   --  381.40*  --    FOLATE  --   --   --  4.87   VITAMIN B 12  --   --   --  743   ABO TYPING O   < > O  --    RH TYPING Positive   < > Positive  --    ANTIBODY SCREEN Negative  --   --   --     < > = values in this interval not displayed.         Brief Urine Lab Results  (Last result in the past 365 days)        Color   Clarity   Blood   Leuk Est   Nitrite   Protein   CREAT   Urine HCG        12/26/24 2231 Dark Yellow   Cloudy   Negative   Moderate (2+)   Negative   Trace                 Microbiology Results (last 10 days)       Procedure Component Value - Date/Time    Blood Culture - Blood, Arm, Left [[id number]]  (Normal) Collected: 12/26/24 2328    Lab Status: Preliminary result Specimen: Blood from Arm, Left Updated: 12/29/24 2346     Blood Culture No growth at 3 days    Narrative:      Less than seven (7) mL's of blood was collected.  Insufficient quantity may yield false negative results.    Blood Culture - Blood, Arm, Left [[id number]]  (Normal) Collected: 12/26/24 2328    Lab Status: Preliminary result Specimen: Blood from Arm, Left Updated: 12/29/24 2346     Blood Culture No growth at 3 days    Narrative:      Less than seven (7) mL's of blood was collected.  Insufficient quantity may yield false negative results.    Urine Culture - Urine, Urine, Clean Catch [[id number]] Collected: 12/26/24 2231    Lab Status: Final result Specimen: Urine, Clean Catch Updated: 12/28/24 1451     Urine Culture >100,000 CFU/mL Mixed Kim Isolated    Narrative:      Specimen contains mixed organisms of questionable pathogenicity suggestive of contamination. If symptoms persist, suggest recollection.  Colonization of the urinary tract without infection is common. Treatment is discouraged unless the patient is symptomatic, pregnant, or undergoing an invasive urologic  procedure.                      Results for orders placed during the hospital encounter of 12/10/22    Adult Transthoracic Echo Complete w/ Color, Spectral and Contrast if necessary per protocol    Interpretation Summary    Left ventricular systolic function is normal. Left ventricular ejection fraction appears to be 51 - 55%.    Left ventricular wall thickness is consistent with mild concentric hypertrophy.    Left ventricular diastolic function is consistent with (grade Ia w/high LAP) impaired relaxation.    Trace tricuspid valve regurgitation is present      Labs Pending at Discharge:  Pending Labs       Order Current Status    Tissue Pathology Exam In process    Blood Culture - Blood, Arm, Left Preliminary result    Blood Culture - Blood, Arm, Left Preliminary result            Time spent on Discharge including face to face service:  32 minutes    Electronically signed by Hi Boo MD, 12/30/24, 9:58 AM EST.

## 2024-12-30 NOTE — CONSULTS
This patient is active on our service at this time. This consult is for hospice referral    Ms Stratton is alert and oriented x 4. She has opted to discharge to [facility] today. A hospice referral was sent. Pt is to be seen at [facility]  Katlyn OTERO RN, BSN  Palliative Care.

## 2024-12-31 LAB
BACTERIA SPEC AEROBE CULT: NORMAL
BACTERIA SPEC AEROBE CULT: NORMAL
CYTO UR: NORMAL
LAB AP CASE REPORT: NORMAL
LAB AP CLINICAL INFORMATION: NORMAL
LAB AP SPECIAL STAINS: NORMAL
PATH REPORT.FINAL DX SPEC: NORMAL
PATH REPORT.GROSS SPEC: NORMAL

## 2024-12-31 NOTE — OUTREACH NOTE
Prep Survey      Flowsheet Row Responses   [facility] patient discharged from? [facility]   Is LACE score < 7 ? No   Eligibility Readm Mgmt   Discharge diagnosis dizziness, UTI   Does the patient have one of the following disease processes/diagnoses(primary or secondary)? Other   Does the patient have Home health ordered? Yes   What is the Home health agency?  [facility]    Is there a DME ordered? No   General alerts for this patient [facility]   Prep survey completed? Yes            Lisy RODRIGUES - Registered Nurse

## 2025-01-03 ENCOUNTER — DOCUMENTATION (OUTPATIENT)
Dept: ONCOLOGY | Facility: HOSPITAL | Age: 80
End: 2025-01-03
Payer: MEDICARE

## 2025-01-03 NOTE — PROGRESS NOTES
OSW had received a referral from the medical oncologist to further address Ms. Stratton's financial and emotional distress related to her cancer diagnosis and treatment. In the interim, Ms. Stratton was admitted to the hospital and decided to not pursue cancer treatment. She discharged to [facility] and admitted with hospice care on 12/30/24. [facility] will provide her ongoing care and support. OSW notified the oncology team. OSW support remains available.

## 2025-01-07 ENCOUNTER — READMISSION MANAGEMENT (OUTPATIENT)
Dept: CALL CENTER | Facility: HOSPITAL | Age: 80
End: 2025-01-07
Payer: MEDICARE

## 2025-01-07 NOTE — OUTREACH NOTE
Medical Week 1 Survey      Flowsheet Row Responses   [facility] patient discharged from? [facility]   Does the patient have one of the following disease processes/diagnoses(primary or secondary)? Other   Week 1 attempt successful? No   Unsuccessful attempts Attempt 1            Mulu Green Registered Nurse

## 2025-01-08 DIAGNOSIS — I26.99 PULMONARY EMBOLISM, UNSPECIFIED CHRONICITY, UNSPECIFIED PULMONARY EMBOLISM TYPE, UNSPECIFIED WHETHER ACUTE COR PULMONALE PRESENT: ICD-10-CM

## 2025-01-08 RX ORDER — APIXABAN 5 MG (74)
KIT ORAL SEE ADMIN INSTRUCTIONS
Qty: 74 TABLET | Refills: 0 | OUTPATIENT
Start: 2025-01-08

## 2025-01-13 ENCOUNTER — TELEPHONE (OUTPATIENT)
Dept: ONCOLOGY | Facility: HOSPITAL | Age: 80
End: 2025-01-13
Payer: COMMERCIAL

## 2025-01-13 ENCOUNTER — TELEPHONE (OUTPATIENT)
Dept: GASTROENTEROLOGY | Facility: CLINIC | Age: 80
End: 2025-01-13
Payer: COMMERCIAL

## 2025-01-13 NOTE — TELEPHONE ENCOUNTER
LEFT MESSAGE FOR PATIENT IN REGARDS TO IRON INFUSION SCHEDULING, ASKED FOR PATIENT TO CALL OFFICE BACK FOR SCHEDULING.

## 2025-01-13 NOTE — TELEPHONE ENCOUNTER
EGD results were discussed prior to discharge (see discharge summary).    Patient is established with Dr. Valentin, however it is noted in discharge summary that patient doesn't wish to pursue treatment for metastatic non-small cell lung cancer.  Patient discharged to assisted living facility and hospice.

## 2025-01-13 NOTE — TELEPHONE ENCOUNTER
----- Message from Maris Charles sent at 1/2/2025 10:04 AM EST -----  I have reviewed upper endoscopy.  Reactive gastropathy.  Chronic inactive gastritis.  Negative results for H. Pylori, metaplasia, dysplasia and malignancy.     To be following up with oncology per Dr. Lane's note.  Patient was inpatient.

## 2025-01-15 LAB
QT INTERVAL: 381 MS
QTC INTERVAL: 454 MS

## 2025-01-16 ENCOUNTER — READMISSION MANAGEMENT (OUTPATIENT)
Dept: CALL CENTER | Facility: HOSPITAL | Age: 80
End: 2025-01-16
Payer: COMMERCIAL

## 2025-01-16 NOTE — OUTREACH NOTE
Medical Week 3 Survey      Flowsheet Row Responses   [facility] facility patient discharged from? [facility]   Does the patient have one of the following disease processes/diagnoses(primary or secondary)? Other   Week 3 attempt successful? No   Unsuccessful attempts Attempt 1            GRAZYNA Green Registered Nurse

## 2025-01-21 ENCOUNTER — READMISSION MANAGEMENT (OUTPATIENT)
Dept: CALL CENTER | Facility: HOSPITAL | Age: 80
End: 2025-01-21
Payer: COMMERCIAL

## 2025-06-20 NOTE — OUTREACH NOTE
Medical Week 3 Survey      Flowsheet Row Responses   [facility] patient discharged from? [facility]   Does the patient have one of the following disease processes/diagnoses(primary or secondary)? Other   Week 3 attempt successful? No   Unsuccessful attempts Attempt 2            FRACISCO ANDUJAR - Registered Nurse   No

## (undated) DEVICE — DISPOSABLE CYTOLOGY BRUSH: Brand: DISPOSABLE CYTOLOGY BRUSH

## (undated) DEVICE — LINER SURG CANSTR SXN S/RIGD 1500CC

## (undated) DEVICE — MAJ-1414 SINGLE USE ADPATER BIOPSY VALV: Brand: SINGLE USE ADAPTOR BIOPSY VALVE

## (undated) DEVICE — SINGLE USE ASPIRATION NEEDLE: Brand: SINGLE USE ASPIRATION NEEDLE

## (undated) DEVICE — SOL IRRG H2O PL/BG 1000ML STRL

## (undated) DEVICE — Device: Brand: BALLOON

## (undated) DEVICE — Device: Brand: DEFENDO AIR/WATER/SUCTION AND BIOPSY VALVE

## (undated) DEVICE — STPCK 1WY SPINLOCK FML LL NONDEHP LF .20ML

## (undated) DEVICE — CONTAINER,SPEC,PNEUM TUBE,4OZ,STRL PATH: Brand: MEDLINE

## (undated) DEVICE — BLCK/BITE BLOX WO/DENTL/RIM W/STRAP 54F

## (undated) DEVICE — SOLIDIFIER LIQLOC PLS 1500CC BT

## (undated) DEVICE — SINGLE USE BIOPSY VALVE MAJ-210: Brand: SINGLE USE BIOPSY VALVE (STERILE)

## (undated) DEVICE — CONN JET HYDRA H20 AUXILIARY DISP

## (undated) DEVICE — SINGLE USE SUCTION VALVE MAJ-209: Brand: SINGLE USE SUCTION VALVE (STERILE)

## (undated) DEVICE — SYR LUER SLPTP 50ML

## (undated) DEVICE — SINGLE-USE BIOPSY FORCEPS: Brand: RADIAL JAW 4

## (undated) DEVICE — Device

## (undated) DEVICE — DEV ATOMIZATION MUCOSAL/NASALTRACH